# Patient Record
Sex: FEMALE | Race: BLACK OR AFRICAN AMERICAN | Employment: UNEMPLOYED | URBAN - METROPOLITAN AREA
[De-identification: names, ages, dates, MRNs, and addresses within clinical notes are randomized per-mention and may not be internally consistent; named-entity substitution may affect disease eponyms.]

---

## 2017-11-13 ENCOUNTER — APPOINTMENT (OUTPATIENT)
Dept: GENERAL RADIOLOGY | Age: 52
End: 2017-11-13
Attending: EMERGENCY MEDICINE
Payer: COMMERCIAL

## 2017-11-13 ENCOUNTER — HOSPITAL ENCOUNTER (EMERGENCY)
Age: 52
Discharge: HOME | End: 2017-11-13
Attending: EMERGENCY MEDICINE | Admitting: EMERGENCY MEDICINE
Payer: COMMERCIAL

## 2017-11-13 VITALS
DIASTOLIC BLOOD PRESSURE: 65 MMHG | HEART RATE: 100 BPM | WEIGHT: 200 LBS | TEMPERATURE: 98 F | HEIGHT: 65 IN | RESPIRATION RATE: 20 BRPM | OXYGEN SATURATION: 96 % | SYSTOLIC BLOOD PRESSURE: 109 MMHG | BODY MASS INDEX: 33.32 KG/M2

## 2017-11-13 DIAGNOSIS — M79.644 FINGER PAIN, RIGHT: Primary | ICD-10-CM

## 2017-11-13 PROCEDURE — 73140 X-RAY EXAM OF FINGER(S): CPT

## 2017-11-13 PROCEDURE — 99283 EMERGENCY DEPT VISIT LOW MDM: CPT

## 2017-11-13 PROCEDURE — 77030008323 HC SPLNT FNGR GTR DJOR -A

## 2017-11-13 RX ORDER — MIRTAZAPINE 30 MG/1
30 TABLET, FILM COATED ORAL
COMMUNITY
End: 2020-10-29

## 2017-11-13 RX ORDER — HYDROCODONE BITARTRATE AND ACETAMINOPHEN 5; 325 MG/1; MG/1
TABLET ORAL
Qty: 12 TAB | Refills: 0 | Status: SHIPPED | OUTPATIENT
Start: 2017-11-13 | End: 2017-11-29

## 2017-11-13 RX ORDER — HYDROXYZINE 50 MG/1
50 TABLET, FILM COATED ORAL
COMMUNITY
End: 2020-10-29

## 2017-11-13 RX ORDER — ALBUTEROL SULFATE 90 UG/1
2 AEROSOL, METERED RESPIRATORY (INHALATION)
COMMUNITY
End: 2018-04-01

## 2017-11-13 RX ORDER — ESCITALOPRAM OXALATE 20 MG/1
20 TABLET ORAL DAILY
COMMUNITY
End: 2020-10-29

## 2017-11-13 NOTE — ED NOTES
Amelie Munguia is a 54 y.o. female that was discharged in stable. Pt was accompanied by self. Pt is driving. The patients diagnosis, condition and treatment were explained to  patient and aftercare instructions were given. The patient verbalized understanding. Patient armband removed and shredded.

## 2017-11-29 ENCOUNTER — HOSPITAL ENCOUNTER (EMERGENCY)
Age: 52
Discharge: HOME | End: 2017-11-29
Attending: EMERGENCY MEDICINE
Payer: COMMERCIAL

## 2017-11-29 VITALS
OXYGEN SATURATION: 95 % | BODY MASS INDEX: 33.15 KG/M2 | SYSTOLIC BLOOD PRESSURE: 115 MMHG | DIASTOLIC BLOOD PRESSURE: 84 MMHG | HEIGHT: 65 IN | WEIGHT: 199 LBS | TEMPERATURE: 97.8 F | RESPIRATION RATE: 20 BRPM | HEART RATE: 99 BPM

## 2017-11-29 DIAGNOSIS — M54.42 ACUTE LEFT-SIDED LOW BACK PAIN WITH LEFT-SIDED SCIATICA: Primary | ICD-10-CM

## 2017-11-29 DIAGNOSIS — S39.012A STRAIN OF LUMBAR REGION, INITIAL ENCOUNTER: ICD-10-CM

## 2017-11-29 PROCEDURE — 99282 EMERGENCY DEPT VISIT SF MDM: CPT

## 2017-11-29 RX ORDER — HYDROCODONE BITARTRATE AND ACETAMINOPHEN 5; 325 MG/1; MG/1
1 TABLET ORAL
Qty: 12 TAB | Refills: 0 | Status: SHIPPED | OUTPATIENT
Start: 2017-11-29 | End: 2018-02-01

## 2017-11-29 RX ORDER — TRAMADOL HYDROCHLORIDE 50 MG/1
50 TABLET ORAL
Qty: 14 TAB | Refills: 0 | Status: SHIPPED | OUTPATIENT
Start: 2017-11-29 | End: 2017-11-29

## 2017-11-29 RX ORDER — PREDNISONE 10 MG/1
TABLET ORAL
Qty: 21 TAB | Refills: 0 | Status: SHIPPED | OUTPATIENT
Start: 2017-11-29 | End: 2018-02-01

## 2017-11-29 NOTE — DISCHARGE INSTRUCTIONS
Back Pain: Care Instructions  Your Care Instructions    Back pain has many possible causes. It is often related to problems with muscles and ligaments of the back. It may also be related to problems with the nerves, discs, or bones of the back. Moving, lifting, standing, sitting, or sleeping in an awkward way can strain the back. Sometimes you don't notice the injury until later. Arthritis is another common cause of back pain. Although it may hurt a lot, back pain usually improves on its own within several weeks. Most people recover in 12 weeks or less. Using good home treatment and being careful not to stress your back can help you feel better sooner. Follow-up care is a key part of your treatment and safety. Be sure to make and go to all appointments, and call your doctor if you are having problems. It's also a good idea to know your test results and keep a list of the medicines you take. How can you care for yourself at home? · Sit or lie in positions that are most comfortable and reduce your pain. Try one of these positions when you lie down:  ¨ Lie on your back with your knees bent and supported by large pillows. ¨ Lie on the floor with your legs on the seat of a sofa or chair. Delmi Running on your side with your knees and hips bent and a pillow between your legs. ¨ Lie on your stomach if it does not make pain worse. · Do not sit up in bed, and avoid soft couches and twisted positions. Bed rest can help relieve pain at first, but it delays healing. Avoid bed rest after the first day of back pain. · Change positions every 30 minutes. If you must sit for long periods of time, take breaks from sitting. Get up and walk around, or lie in a comfortable position. · Try using a heating pad on a low or medium setting for 15 to 20 minutes every 2 or 3 hours. Try a warm shower in place of one session with the heating pad. · You can also try an ice pack for 10 to 15 minutes every 2 to 3 hours.  Put a thin cloth between the ice pack and your skin. · Take pain medicines exactly as directed. ¨ If the doctor gave you a prescription medicine for pain, take it as prescribed. ¨ If you are not taking a prescription pain medicine, ask your doctor if you can take an over-the-counter medicine. · Take short walks several times a day. You can start with 5 to 10 minutes, 3 or 4 times a day, and work up to longer walks. Walk on level surfaces and avoid hills and stairs until your back is better. · Return to work and other activities as soon as you can. Continued rest without activity is usually not good for your back. · To prevent future back pain, do exercises to stretch and strengthen your back and stomach. Learn how to use good posture, safe lifting techniques, and proper body mechanics. When should you call for help? Call your doctor now or seek immediate medical care if:  ? · You have new or worsening numbness in your legs. ? · You have new or worsening weakness in your legs. (This could make it hard to stand up.)   ? · You lose control of your bladder or bowels. ? Watch closely for changes in your health, and be sure to contact your doctor if:  ? · Your pain gets worse. ? · You are not getting better after 2 weeks. Where can you learn more? Go to http://marcial-karmen.info/. Enter N059 in the search box to learn more about \"Back Pain: Care Instructions. \"  Current as of: March 21, 2017  Content Version: 11.4  © 1547-6237 [employer]. Care instructions adapted under license by [employer] (which disclaims liability or warranty for this information). If you have questions about a medical condition or this instruction, always ask your healthcare professional. [address] any warranty or liability for your use of this information. MyChart Activation    Thank you for requesting access to [facility].  Please follow the instructions below to securely access and download your online medical record. [facility] allows you to send messages to your doctor, view your test results, renew your prescriptions, schedule appointments, and more. How Do I Sign Up? 1. In your internet browser, go to [email or web address]  2. Click on the First Time User? Click Here link in the Sign In box. You will be redirect to the New Member Sign Up page. 3. Enter your [employer] Access Code exactly as it appears below. You will not need to use this code after youve completed the sign-up process. If you do not sign up before the expiration date, you must request a new code. [employer] Access Code: Atif Howe  Expires: 2018  2:46 PM (This is the date your [employer] access code will )    4. Enter the last four digits of your Social Security Number (xxxx) and Date of Birth (mm/dd/yyyy) as indicated and click Submit. You will be taken to the next sign-up page. 5. Create a [employer] ID. This will be your [employer] login ID and cannot be changed, so think of one that is secure and easy to remember. 6. Create a [employer] password. You can change your password at any time. 7. Enter your Password Reset Question and Answer. This can be used at a later time if you forget your password. 8. Enter your e-mail address. You will receive e-mail notification when new information is available in [address] 9. Click Sign Up. You can now view and download portions of your medical record. 10. Click the Download Summary menu link to download a portable copy of your medical information. Additional Information    If you have questions, please visit the Frequently Asked Questions section of the [employer] website at [email or web address] Remember, [employer] is NOT to be used for urgent needs. For medical emergencies, dial 911.

## 2017-11-29 NOTE — ED TRIAGE NOTES
Patient made aware of potential extended wait for room placement related to ER high volume. Patient advised to report any new or worsening symptoms immediately. Patient voices understanding.

## 2017-11-29 NOTE — ED PROVIDER NOTES
HPI Comments: 54 yr old female hx asthma, sciatica, carpal tunnel, and arthritis presents to the ED complaining of L sided low back pain radiating into the L leg x 2 days. Pt states she recently did some heavy lifting and thinks she may \"pinched a nerve in the back. \" Denies loss of bladder/bowel control, numbness, and urinary sx. OTC med shave not alleviated her sx. Denies any trauma to the back. No other complaints. Patient is a 54 y.o. female presenting with back pain. Back Pain    Pertinent negatives include no chest pain, no fever, no headaches, no abdominal pain and no dysuria. Past Medical History:   Diagnosis Date    Arthritis     Asthma     Bilateral leg pain     Carpal tunnel syndrome, right     symptoms    DDD (degenerative disc disease), lumbar     mild    Eczema     Lower back pain     Migraines     Myalgia     lumbar    Numbness and tingling in right hand     Pinched nerve     Sciatica     right lower extremity       Past Surgical History:   Procedure Laterality Date    HX PARTIAL HYSTERECTOMY      HX TONSILLECTOMY      HX TUBAL LIGATION           Family History:   Problem Relation Age of Onset    Hypertension Other     Asthma Other     Hypertension Mother    Emi Coello Arthritis-osteo Mother        Social History     Social History    Marital status:      Spouse name: N/A    Number of children: N/A    Years of education: N/A     Occupational History    Not on file. Social History Main Topics    Smoking status: Former Smoker     Quit date: 3/1/2012    Smokeless tobacco: Never Used      Comment: less than a pack a day.   Current note from 4-1-15 states patient is a non-smoker    Alcohol use No    Drug use: Yes     Special: Cocaine      Comment: last uses 1    Sexual activity: Not on file     Other Topics Concern    Not on file     Social History Narrative         ALLERGIES: Egg; Ibuprofen; Darvocet a500 [propoxyphene n-acetaminophen]; and Fish containing products    Review of Systems   Constitutional: Negative. Negative for chills, diaphoresis, fatigue and fever. HENT: Negative. Negative for congestion, ear pain, rhinorrhea and sore throat. Eyes: Negative. Negative for pain and redness. Respiratory: Negative. Negative for cough, shortness of breath, wheezing and stridor. Cardiovascular: Negative. Negative for chest pain and leg swelling. Gastrointestinal: Negative. Negative for abdominal pain, constipation, diarrhea, nausea and vomiting. Endocrine: Negative. Genitourinary: Negative. Negative for dysuria, flank pain, frequency and hematuria. Musculoskeletal: Positive for back pain. Negative for myalgias, neck pain and neck stiffness. Skin: Negative. Negative for rash and wound. Allergic/Immunologic: Negative. Neurological: Negative. Negative for dizziness, seizures, syncope and headaches. Hematological: Negative. Psychiatric/Behavioral: Negative. All other systems reviewed and are negative. Vitals:    11/29/17 1404   BP: 115/84   Pulse: 99   Resp: 20   Temp: 97.8 °F (36.6 °C)   SpO2: 95%   Weight: 90.3 kg (199 lb)   Height: 5' 5\" (1.651 m)            Physical Exam   Constitutional: She is oriented to person, place, and time. She appears well-developed and well-nourished. She appears distressed. Mildly distressed   HENT:   Head: Normocephalic. Neck: Normal range of motion. Neck supple. Cardiovascular: Normal rate, regular rhythm and normal heart sounds. Exam reveals no gallop and no friction rub. No murmur heard. Pulmonary/Chest: Effort normal and breath sounds normal. No stridor. No respiratory distress. She has no wheezes. She has no rales. Musculoskeletal: Normal range of motion. She exhibits tenderness. She exhibits no edema or deformity. No midline TTP along the spine, TTP noted to the L lumbar erector spinae, ROM intact, SLR positive on the L.  Good sensation and strength of the bilateral lower extremities. Neurological: She is alert and oriented to person, place, and time. Coordination normal.   Gait is steady. Able to ambulate without difficulty. Skin: Skin is warm and dry. No rash noted. She is not diaphoretic. No erythema. Psychiatric: She has a normal mood and affect. Her behavior is normal. Thought content normal.   Nursing note and vitals reviewed. MDM  Number of Diagnoses or Management Options  Acute left-sided low back pain with left-sided sciatica:   Strain of lumbar region, initial encounter:   Diagnosis management comments: Impression:  Lumbar strain, sciatica    Pt declined x-rays    Patient is stable for discharge at this time. Rx for prednisone and norco given. Rest and follow-up with PCP this week. Return to the ED immediately for any new or worsening sx.   Mellisa Martinez PA-C 4:17 PM     Risk of Complications, Morbidity, and/or Mortality  Presenting problems: low  Diagnostic procedures: low  Management options: low    Patient Progress  Patient progress: stable    ED Course       Procedures

## 2017-11-29 NOTE — ED TRIAGE NOTES
Patient states \"pinched nerve\" in her back. Patient states left side back pain onset yesterday. She states that she does not remember what she did to cause onset of pain. States:  'Maybe picking up something too heavy\". Patient states: \"I am wobbling side to side from the pain in my leg\" pointing to left leg. States onset of left leg pain yesterday at same time as back pain onset.

## 2017-12-13 ENCOUNTER — HOSPITAL ENCOUNTER (OUTPATIENT)
Age: 52
Discharge: HOME | End: 2017-12-13
Attending: ORTHOPAEDIC SURGERY
Payer: COMMERCIAL

## 2017-12-13 DIAGNOSIS — S33.5XXA LUMBAR BACK SPRAIN: ICD-10-CM

## 2017-12-13 PROCEDURE — 72148 MRI LUMBAR SPINE W/O DYE: CPT

## 2018-02-01 ENCOUNTER — HOSPITAL ENCOUNTER (EMERGENCY)
Age: 53
Discharge: HOME | End: 2018-02-01
Attending: EMERGENCY MEDICINE | Admitting: EMERGENCY MEDICINE
Payer: COMMERCIAL

## 2018-02-01 VITALS
HEART RATE: 96 BPM | SYSTOLIC BLOOD PRESSURE: 101 MMHG | RESPIRATION RATE: 18 BRPM | DIASTOLIC BLOOD PRESSURE: 76 MMHG | TEMPERATURE: 98 F | WEIGHT: 200 LBS | BODY MASS INDEX: 32.14 KG/M2 | HEIGHT: 66 IN | OXYGEN SATURATION: 99 %

## 2018-02-01 DIAGNOSIS — B34.9 VIRAL SYNDROME: Primary | ICD-10-CM

## 2018-02-01 DIAGNOSIS — R19.7 DIARRHEA, UNSPECIFIED TYPE: ICD-10-CM

## 2018-02-01 LAB
FLUAV AG NPH QL IA: NEGATIVE
FLUBV AG NOSE QL IA: NEGATIVE

## 2018-02-01 PROCEDURE — 99283 EMERGENCY DEPT VISIT LOW MDM: CPT

## 2018-02-01 PROCEDURE — 74011250637 HC RX REV CODE- 250/637: Performed by: EMERGENCY MEDICINE

## 2018-02-01 PROCEDURE — 87804 INFLUENZA ASSAY W/OPTIC: CPT | Performed by: EMERGENCY MEDICINE

## 2018-02-01 RX ORDER — CODEINE PHOSPHATE AND GUAIFENESIN 10; 100 MG/5ML; MG/5ML
5-10 SOLUTION ORAL
Qty: 125 ML | Refills: 0 | Status: SHIPPED | OUTPATIENT
Start: 2018-02-01 | End: 2018-04-01

## 2018-02-01 RX ORDER — GABAPENTIN 300 MG/1
300 CAPSULE ORAL 3 TIMES DAILY
COMMUNITY
End: 2019-11-13

## 2018-02-01 RX ORDER — ACETAMINOPHEN 325 MG/1
650 TABLET ORAL
Status: COMPLETED | OUTPATIENT
Start: 2018-02-01 | End: 2018-02-01

## 2018-02-01 RX ORDER — ONDANSETRON 4 MG/1
4 TABLET, ORALLY DISINTEGRATING ORAL
Qty: 14 TAB | Refills: 0 | Status: SHIPPED | OUTPATIENT
Start: 2018-02-01 | End: 2018-04-01

## 2018-02-01 RX ORDER — ONDANSETRON 4 MG/1
4 TABLET, ORALLY DISINTEGRATING ORAL
Status: COMPLETED | OUTPATIENT
Start: 2018-02-01 | End: 2018-02-01

## 2018-02-01 RX ORDER — LOPERAMIDE HYDROCHLORIDE 2 MG/1
2 CAPSULE ORAL ONCE
Status: COMPLETED | OUTPATIENT
Start: 2018-02-01 | End: 2018-02-01

## 2018-02-01 RX ADMIN — ONDANSETRON 4 MG: 4 TABLET, ORALLY DISINTEGRATING ORAL at 19:57

## 2018-02-01 RX ADMIN — ACETAMINOPHEN 650 MG: 325 TABLET ORAL at 19:57

## 2018-02-01 RX ADMIN — LOPERAMIDE HYDROCHLORIDE 2 MG: 2 CAPSULE ORAL at 19:57

## 2018-02-01 NOTE — LETTER
NOTIFICATION RETURN TO WORK / SCHOOL 
 
2/1/2018 8:57 PM 
 
Ms. Carlie Henson [address] [facility] [address] To Whom It May Concern: 
 
Carlie Henson is currently under the care of [address] [facility] EMERGENCY DEPT. She will return to work/school on: 2/5/18 If there are questions or concerns please have the patient contact our office.  
 
 
 
Sincerely, 
 
 
Terrell Almaraz MD

## 2018-02-02 NOTE — ED PROVIDER NOTES
EMERGENCY DEPARTMENT HISTORY AND PHYSICAL EXAM    7:35 PM      Date: 2/1/2018  Patient Name: Linda Aj    History of Presenting Illness     Chief Complaint   Patient presents with    Generalized Body Aches    Diarrhea    Nausea         History Provided By: Patient    Chief Complaint: generalized body aches  Duration: 2 Days  Timing:  Acute  Location: whole body  Quality: Dull  Severity: Mild  Modifying Factors: Denies any modifying factors. Associated Symptoms: nausea, chills, diarrhea, decreased appetite, coughing, congestion      Additional History (Context): Linda Aj is a 50 y.o. female with a Hx of asthma, myalgia, migraines, arthritis, who presents with acute generalized body aches onset 2 days ago. The patient reports that her symptoms first began two days ago with generalized body aches, cough, and congestion. Since that time she has also developed nausea, diarrhea, decreased appetite, and chills. The patient denies any specific points of pain, but states it hurts all over. She denies any modifying factors, and has taken no medications for them. Denies any change in urinary frequency, dysuria, or vomiting. No further complaints at this time. PCP: Amanda Rueda MD    Current Outpatient Prescriptions   Medication Sig Dispense Refill    gabapentin (NEURONTIN) 300 mg capsule Take 300 mg by mouth three (3) times daily.  mirtazapine (REMERON) 30 mg tablet Take 30 mg by mouth nightly.  escitalopram oxalate (LEXAPRO) 20 mg tablet Take 20 mg by mouth daily.  hydrOXYzine HCl (ATARAX) 50 mg tablet Take 50 mg by mouth three (3) times daily as needed for Itching.  albuterol (PROVENTIL HFA, VENTOLIN HFA, PROAIR HFA) 90 mcg/actuation inhaler Take 2 Puffs by inhalation every six (6) hours as needed for Wheezing.          Past History     Past Medical History:  Past Medical History:   Diagnosis Date    Arthritis     Asthma     Bilateral leg pain     Carpal tunnel syndrome, right symptoms    DDD (degenerative disc disease), lumbar     mild    Eczema     Lower back pain     Migraines     Myalgia     lumbar    Numbness and tingling in right hand     Pinched nerve     Sciatica     right lower extremity       Past Surgical History:  Past Surgical History:   Procedure Laterality Date    HX PARTIAL HYSTERECTOMY      HX TONSILLECTOMY      HX TUBAL LIGATION         Family History:  Family History   Problem Relation Age of Onset    Hypertension Other     Asthma Other     Hypertension Mother    Damon Rey Arthritis-osteo Mother        Social History:  Social History   Substance Use Topics    Smoking status: Former Smoker     Quit date: 3/1/2012    Smokeless tobacco: Never Used      Comment: less than a pack a day. Current note from 4-1-15 states patient is a non-smoker    Alcohol use No       Allergies: Allergies   Allergen Reactions    Egg Anaphylaxis    Ibuprofen Hives    Darvocet A500 [Propoxyphene N-Acetaminophen] Hives    Fish Containing Products Hives         Review of Systems       Review of Systems   Constitutional: Positive for appetite change (decreased appetite) and chills. Fever: patient unsure if she has had a fever. HENT: Positive for congestion. Respiratory: Positive for cough. Negative for shortness of breath. Cardiovascular: Negative for chest pain. Gastrointestinal: Positive for diarrhea and nausea. Negative for abdominal pain and vomiting. Genitourinary: Negative for dysuria and frequency. Musculoskeletal: Positive for myalgias. Negative for back pain. Joint swelling: generalized body aches. Skin: Negative for rash. Neurological: Negative for syncope and light-headedness. All other systems reviewed and are negative.         Physical Exam     Visit Vitals    /76    Pulse 96    Temp 98 °F (36.7 °C)    Resp 18    Ht 5' 6\" (1.676 m)    Wt 90.7 kg (200 lb)    SpO2 99%    BMI 32.28 kg/m2         Physical Exam   Constitutional: She is oriented to person, place, and time. She appears well-developed. HENT:   Head: Normocephalic. Mouth/Throat: Oropharynx is clear and moist.   Eyes: Pupils are equal, round, and reactive to light. Neck: Normal range of motion. Cardiovascular: Normal heart sounds. Tachycardia present. No murmur heard. Tachycardic with regular rhythm. Pulmonary/Chest: Effort normal. She has no rales. Abdominal: Soft. There is no tenderness. Musculoskeletal: Normal range of motion. She exhibits no edema. Neurological: She is alert and oriented to person, place, and time. Skin: Skin is warm and dry. Nursing note and vitals reviewed. Diagnostic Study Results     Vitals:  Patient Vitals for the past 12 hrs:   Temp Pulse Resp BP SpO2   02/01/18 2049 98 °F (36.7 °C) 96 18 101/76 99 %   02/01/18 1919 97.9 °F (36.6 °C) (!) 110 20 (!) 120/94 96 %         Medications ordered:   Medications   loperamide (IMODIUM) capsule 2 mg (2 mg Oral Given 2/1/18 1957)   ondansetron (ZOFRAN ODT) tablet 4 mg (4 mg Oral Given 2/1/18 1957)   acetaminophen (TYLENOL) tablet 650 mg (650 mg Oral Given 2/1/18 1957)         Lab findings:  Recent Results (from the past 12 hour(s))   INFLUENZA A & B AG (RAPID TEST)    Collection Time: 02/01/18  7:50 PM   Result Value Ref Range    Influenza A Antigen NEGATIVE  NEG      Influenza B Antigen NEGATIVE  NEG             X-Ray, CT or other radiology findings or impressions:  No orders to display       Progress notes, Consult notes or additional Procedure notes:   8:48 PM reevaluated the pt. Pt is feeling better, with no complaints. Waiting for repeat vital signs, plan to discharge. 8:58 PM Pt is pain free, feeling much better. Af, nl vitals. Not c/w bacterial inf/sepsis/influenza. Stable for dc and close f.u  asympt at dc. Detailed ret inst given. Disposition:  Diagnosis:   1. Viral syndrome    2.  Diarrhea, unspecified type        Disposition: home    Follow-up Information     Follow up With Details Comments Thor Bowers MD Schedule an appointment as soon as possible for a visit in 1 day  [address] [phone]             Patient's Medications   Start Taking    No medications on file   Continue Taking    ALBUTEROL (PROVENTIL HFA, VENTOLIN HFA, PROAIR HFA) 90 MCG/ACTUATION INHALER    Take 2 Puffs by inhalation every six (6) hours as needed for Wheezing. ESCITALOPRAM OXALATE (LEXAPRO) 20 MG TABLET    Take 20 mg by mouth daily. GABAPENTIN (NEURONTIN) 300 MG CAPSULE    Take 300 mg by mouth three (3) times daily. HYDROXYZINE HCL (ATARAX) 50 MG TABLET    Take 50 mg by mouth three (3) times daily as needed for Itching. MIRTAZAPINE (REMERON) 30 MG TABLET    Take 30 mg by mouth nightly. These Medications have changed    No medications on file   Stop Taking    HYDROCODONE-ACETAMINOPHEN (NORCO) 5-325 MG PER TABLET    Take 1 Tab by mouth every six (6) hours as needed for Pain. Max Daily Amount: 4 Tabs. PREDNISONE (STERAPRED DS) 10 MG DOSE PACK    Use as directed         Scribe Attestation     Martha Fragoso acting as a scribe for and in the presence of Jan Martinez MD      February 01, 2018 at 7:35 PM       Provider Attestation:      I personally performed the services described in the documentation, reviewed the documentation, as recorded by the scribe in my presence, and it accurately and completely records my words and actions.  February 01, 2018 at 7:35 PM - Jan Martinez MD

## 2018-02-02 NOTE — ED NOTES
Flu swab obtained from pt, pt medicated and pt given fluids at this time. Pt c/o nausea with diarrhea.  Pt awake and alert color pink

## 2018-02-02 NOTE — ED TRIAGE NOTES
C/o generalized bodyaches & chills, headache, nausea,  diarrhea x 3 days. States 3 episodes of diarrhea today. Denies vomiting. Has not taken any OTC meds for symptoms.

## 2018-02-02 NOTE — ED NOTES
Hourly rounding complete.    Safety  Pt resting    [ x] On stretcher with side rails up and bed in locked position, call bell within reach  [ ] Sitting in chair with casters locked, call bell within reach     Toileting  [  x] pt denies need to use bathroom  [ ] pt assisted to bathroom  [ ] pt assisted with bedpan  [ ] pt independent to bathroom as needed     Ongoing Plan of Care  Plan of care and expected time for test and results reviewed with pt.     Pain Management / Comfort  [ ] dimmed lights  [ ] warm blanket provided  x[ ] pain assessed, pt denies pain at this time, pt states that she feels better, no diarrhea since arrival to ED  [ ] monitor alarms reviewed

## 2018-04-01 ENCOUNTER — APPOINTMENT (OUTPATIENT)
Dept: GENERAL RADIOLOGY | Age: 53
End: 2018-04-01
Attending: EMERGENCY MEDICINE
Payer: COMMERCIAL

## 2018-04-01 ENCOUNTER — HOSPITAL ENCOUNTER (EMERGENCY)
Age: 53
Discharge: HOME | End: 2018-04-01
Attending: EMERGENCY MEDICINE
Payer: COMMERCIAL

## 2018-04-01 VITALS
RESPIRATION RATE: 22 BRPM | SYSTOLIC BLOOD PRESSURE: 114 MMHG | BODY MASS INDEX: 33.32 KG/M2 | HEART RATE: 105 BPM | HEIGHT: 65 IN | OXYGEN SATURATION: 95 % | DIASTOLIC BLOOD PRESSURE: 76 MMHG | WEIGHT: 200 LBS | TEMPERATURE: 98.5 F

## 2018-04-01 DIAGNOSIS — J44.9 ASTHMATIC BRONCHITIS , CHRONIC (HCC): Primary | ICD-10-CM

## 2018-04-01 PROCEDURE — 74011250637 HC RX REV CODE- 250/637: Performed by: EMERGENCY MEDICINE

## 2018-04-01 PROCEDURE — 99283 EMERGENCY DEPT VISIT LOW MDM: CPT

## 2018-04-01 PROCEDURE — 93005 ELECTROCARDIOGRAM TRACING: CPT

## 2018-04-01 PROCEDURE — 71046 X-RAY EXAM CHEST 2 VIEWS: CPT

## 2018-04-01 RX ORDER — ALBUTEROL SULFATE 90 UG/1
2 AEROSOL, METERED RESPIRATORY (INHALATION)
Qty: 1 INHALER | Refills: 0 | Status: SHIPPED | OUTPATIENT
Start: 2018-04-01 | End: 2019-11-13

## 2018-04-01 RX ORDER — AMOXICILLIN 500 MG/1
500 TABLET, FILM COATED ORAL 3 TIMES DAILY
Qty: 30 TAB | Refills: 0 | Status: SHIPPED | OUTPATIENT
Start: 2018-04-01 | End: 2018-04-11

## 2018-04-01 RX ORDER — GUAIFENESIN 100 MG/5ML
200 SOLUTION ORAL
Status: COMPLETED | OUTPATIENT
Start: 2018-04-01 | End: 2018-04-01

## 2018-04-01 RX ORDER — AMOXICILLIN 250 MG/1
500 CAPSULE ORAL EVERY 8 HOURS
Status: DISCONTINUED | OUTPATIENT
Start: 2018-04-01 | End: 2018-04-01

## 2018-04-01 RX ORDER — AMOXICILLIN 250 MG/1
500 CAPSULE ORAL EVERY 8 HOURS
Status: DISCONTINUED | OUTPATIENT
Start: 2018-04-01 | End: 2018-04-02 | Stop reason: HOSPADM

## 2018-04-01 RX ORDER — GUAIFENESIN 100 MG/5ML
200 SOLUTION ORAL
Qty: 10 ML | Refills: 0 | Status: SHIPPED | OUTPATIENT
Start: 2018-04-01 | End: 2019-11-13

## 2018-04-01 RX ADMIN — AMOXICILLIN 500 MG: 250 CAPSULE ORAL at 20:51

## 2018-04-01 RX ADMIN — GUAIFENESIN 200 MG: 200 SOLUTION ORAL at 20:51

## 2018-04-01 NOTE — ED TRIAGE NOTES
Pt. Reports having the flu and coughing up yellow mucous, she states she is now coughing up clear fluids. She states she now is coughing up clear mucous and having chest congestion with rib pain when she coughs, she states she thinks she may have pneumonia. She denies CHEST PAIN or SOB.

## 2018-04-02 LAB
ATRIAL RATE: 106 BPM
CALCULATED P AXIS, ECG09: 65 DEGREES
CALCULATED R AXIS, ECG10: -11 DEGREES
CALCULATED T AXIS, ECG11: 42 DEGREES
DIAGNOSIS, 93000: NORMAL
P-R INTERVAL, ECG05: 150 MS
Q-T INTERVAL, ECG07: 364 MS
QRS DURATION, ECG06: 66 MS
QTC CALCULATION (BEZET), ECG08: 483 MS
VENTRICULAR RATE, ECG03: 106 BPM

## 2018-04-02 NOTE — ED PROVIDER NOTES
EMERGENCY DEPARTMENT HISTORY AND PHYSICAL EXAM    8:29 PM      Date: 4/1/2018  Patient Name: Sendy Navarro    History of Presenting Illness     Chief Complaint   Patient presents with    Cough    Chest Congestion         History Provided By: Patient    Chief Complaint: Congestion; Cough   Duration:  Weeks  Timing:  Constant  Location: N/A  Quality: N/A  Severity: N/A  Modifying Factors: None   Associated Symptoms: Rib pain when coughing       Additional History (Context): Sendy Navarro is a 50 y.o. female with PMHx of asthma presenting to the ED c/o constant productive cough and congestion for the past 2 weeks. Pt also reports rib pain when coughing. Reports no modifying factors for her symptoms. States she recently had the flu. Notes she was coughing up yellow mucus and is now coughing up clear mucus. Admits to smoking. Denies chest pain, shortness of breath, and any other symptoms or complaints. PCP: Wilian Merino MD    Current Facility-Administered Medications   Medication Dose Route Frequency Provider Last Rate Last Dose    amoxicillin (AMOXIL) capsule 500 mg  500 mg Oral Q8H Steven Will MD        guaiFENesin (ROBITUSSIN) 100 mg/5 mL oral liquid 200 mg  200 mg Oral NOW Steven Will MD         Current Outpatient Prescriptions   Medication Sig Dispense Refill    amoxicillin 500 mg tab Take 500 mg by mouth three (3) times daily for 10 days. 30 Tab 0    albuterol (PROVENTIL HFA, VENTOLIN HFA, PROAIR HFA) 90 mcg/actuation inhaler Take 2 Puffs by inhalation every four (4) hours as needed for Wheezing. 1 Inhaler 0    albuterol (PROVENTIL HFA, VENTOLIN HFA, PROAIR HFA) 90 mcg/actuation inhaler Take 2 Puffs by inhalation every six (6) hours as needed for Wheezing. 1 Inhaler 0    guaiFENesin (ROBITUSSIN) 100 mg/5 mL liquid Take 10 mL by mouth four (4) times daily as needed for Cough. 10 mL 0    gabapentin (NEURONTIN) 300 mg capsule Take 300 mg by mouth three (3) times daily.       mirtazapine (REMERON) 30 mg tablet Take 30 mg by mouth nightly.  escitalopram oxalate (LEXAPRO) 20 mg tablet Take 20 mg by mouth daily.  hydrOXYzine HCl (ATARAX) 50 mg tablet Take 50 mg by mouth three (3) times daily as needed for Itching. Past History     Past Medical History:  Past Medical History:   Diagnosis Date    Arthritis     Asthma     Bilateral leg pain     Carpal tunnel syndrome, right     symptoms    DDD (degenerative disc disease), lumbar     mild    Eczema     Lower back pain     Migraines     Myalgia     lumbar    Numbness and tingling in right hand     Pinched nerve     Sciatica     right lower extremity       Past Surgical History:  Past Surgical History:   Procedure Laterality Date    HX PARTIAL HYSTERECTOMY      HX TONSILLECTOMY      HX TUBAL LIGATION         Family History:  Family History   Problem Relation Age of Onset    Hypertension Other     Asthma Other     Hypertension Mother    Rose Dennis Arthritis-osteo Mother        Social History:  Social History   Substance Use Topics    Smoking status: Former Smoker     Quit date: 3/1/2012    Smokeless tobacco: Never Used      Comment: less than a pack a day. Current note from 4-1-15 states patient is a non-smoker    Alcohol use No       Allergies: Allergies   Allergen Reactions    Egg Anaphylaxis    Ibuprofen Hives    Darvocet A500 [Propoxyphene N-Acetaminophen] Hives    Fish Containing Products Hives         Review of Systems       Review of Systems   Constitutional: Negative. Negative for fever. HENT: Positive for congestion. Eyes: Negative. Respiratory: Positive for cough. Negative for shortness of breath. Cardiovascular: Negative. Negative for chest pain. Gastrointestinal: Negative. Endocrine: Negative. Genitourinary: Negative. Musculoskeletal:        + Rib pain (when coughing)   Skin: Negative. Allergic/Immunologic: Negative. Neurological: Negative. Hematological: Negative. Psychiatric/Behavioral: Negative. All other systems reviewed and are negative. Physical Exam     Visit Vitals    /76 (BP 1 Location: Left arm, BP Patient Position: At rest)    Pulse (!) 105    Temp 98.5 °F (36.9 °C)    Resp 22    Ht 5' 5\" (1.651 m)    Wt 90.7 kg (200 lb)    SpO2 95%    BMI 33.28 kg/m2         Physical Exam   Constitutional: She is oriented to person, place, and time. She appears well-developed and well-nourished. No distress. HENT:   Head: Normocephalic. Right Ear: External ear normal.   Left Ear: External ear normal.   Mouth/Throat: No oropharyngeal exudate. Eyes: Conjunctivae and EOM are normal. Pupils are equal, round, and reactive to light. Right eye exhibits no discharge. Left eye exhibits no discharge. No scleral icterus. Neck: Normal range of motion. Neck supple. No JVD present. No tracheal deviation present. No thyromegaly present. Cardiovascular: Normal rate, regular rhythm, normal heart sounds and intact distal pulses. Exam reveals no gallop and no friction rub. No murmur heard. Pulmonary/Chest: Effort normal. No stridor. No respiratory distress. She has no wheezes. She has rhonchi (faint). She has no rales. She exhibits no tenderness. Abdominal: Soft. Bowel sounds are normal. She exhibits no distension and no mass. There is no tenderness. There is no rebound and no guarding. Musculoskeletal: Normal range of motion. She exhibits no edema or tenderness. Lymphadenopathy:     She has no cervical adenopathy. Neurological: She is alert and oriented to person, place, and time. She displays normal reflexes. No cranial nerve deficit. She exhibits normal muscle tone. Coordination normal.   Skin: Skin is warm and dry. No rash noted. She is not diaphoretic. No erythema. No pallor. Nursing note and vitals reviewed.         Diagnostic Study Results     Labs -  Recent Results (from the past 12 hour(s))   EKG, 12 LEAD, INITIAL    Collection Time: 04/01/18 7:49 PM   Result Value Ref Range    Ventricular Rate 106 BPM    Atrial Rate 106 BPM    P-R Interval 150 ms    QRS Duration 66 ms    Q-T Interval 364 ms    QTC Calculation (Bezet) 483 ms    Calculated P Axis 65 degrees    Calculated R Axis -11 degrees    Calculated T Axis 42 degrees    Diagnosis       Sinus tachycardia  Otherwise normal ECG  No previous ECGs available         Radiologic Studies -   XR CHEST PA LAT    (Results Pending)   8:30 PM Negative chest X-ray. Interpreted by Miguelina Vicente MD       Medical Decision Making   I am the first provider for this patient. I reviewed the vital signs, available nursing notes, past medical history, past surgical history, family history and social history. Vital Signs-Reviewed the patient's vital signs. Pulse Oximetry Analysis -  95% on room air, normal     EKG: Interpreted by the EP. Time Interpreted: 19:50    Rate: 106   Rhythm: Sinus Tachycardia    Interpretation: Negative EKG. Records Reviewed: Nursing Notes (Time of Review: 8:29 PM)    ED Course: Progress Notes, Reevaluation, and Consults:  Patient symptoms resolved with treatment. Provider Notes (Medical Decision Making): Dif Dx: bronchitis, asthma, URI, pneumonia    Diagnosis     Clinical Impression:   1. Asthmatic bronchitis , chronic (HCC)        Disposition: Discharged     Follow-up Information     Follow up With Details Comments Matthew Max MD Call in 2 days For follow up  [address]  [address] [place] [address] [address] [place] [address] EMERGENCY DEPT  As needed, If symptoms worsen Arti Santizo [id number] [phone]           Patient's Medications   Start Taking    ALBUTEROL (PROVENTIL HFA, VENTOLIN HFA, PROAIR HFA) 90 MCG/ACTUATION INHALER    Take 2 Puffs by inhalation every four (4) hours as needed for Wheezing. AMOXICILLIN 500 MG TAB    Take 500 mg by mouth three (3) times daily for 10 days.     GUAIFENESIN (ROBITUSSIN) 100 MG/5 ML LIQUID    Take 10 mL by mouth four (4) times daily as needed for Cough. Continue Taking    ESCITALOPRAM OXALATE (LEXAPRO) 20 MG TABLET    Take 20 mg by mouth daily. GABAPENTIN (NEURONTIN) 300 MG CAPSULE    Take 300 mg by mouth three (3) times daily. HYDROXYZINE HCL (ATARAX) 50 MG TABLET    Take 50 mg by mouth three (3) times daily as needed for Itching. MIRTAZAPINE (REMERON) 30 MG TABLET    Take 30 mg by mouth nightly. These Medications have changed    Modified Medication Previous Medication    ALBUTEROL (PROVENTIL HFA, VENTOLIN HFA, PROAIR HFA) 90 MCG/ACTUATION INHALER albuterol (PROVENTIL HFA, VENTOLIN HFA, PROAIR HFA) 90 mcg/actuation inhaler       Take 2 Puffs by inhalation every six (6) hours as needed for Wheezing. Take 2 Puffs by inhalation every six (6) hours as needed for Wheezing. Stop Taking    GUAIFENESIN-CODEINE (ROBITUSSIN AC) 100-10 MG/5 ML SOLUTION    Take 5-10 mL by mouth every six (6) hours as needed for Cough. Max Daily Amount: 40 mL. ONDANSETRON (ZOFRAN ODT) 4 MG DISINTEGRATING TABLET    Take 1 Tab by mouth every eight (8) hours as needed for Nausea.     _______________________________    Attestations:  Scribe Attestation     Ed West acting as a scribe for and in the presence of Sander Obregon MD      April 01, 2018 at 8:39 PM       Provider Attestation:      I personally performed the services described in the documentation, reviewed the documentation, as recorded by the scribe in my presence, and it accurately and completely records my words and actions.  April 01, 2018 at 8:39 PM - Sander Obregon MD    _______________________________

## 2018-04-02 NOTE — DISCHARGE INSTRUCTIONS
Learning About Chronic Bronchitis  What is chronic bronchitis? Chronic bronchitis is long-term swelling and the buildup of mucus in the airways of your lungs. The airways (bronchial tubes) get inflamed and make a lot of mucus. This can narrow or block the airways, making it hard for you to breathe. It is a form of COPD (chronic obstructive pulmonary disease). Chronic bronchitis is usually caused by smoking. But chemical fumes, dust, or air pollution also can cause it over time. What can you expect when you have chronic bronchitis? Chronic bronchitis gets worse over time. You cannot undo the damage to your lungs. Over time, you may find that:  · You get short of breath even when you do simple things like get dressed or fix a meal.  · It is hard to eat or exercise. · You lose weight and feel weaker. Over many years, the swelling and mucus from chronic bronchitis make it more likely that you will get lung infections. But there are things you can do to prevent more damage and feel better. What are the symptoms? The main symptoms of chronic bronchitis are:  · A cough that will not go away. · Mucus that comes up when you cough. · Shortness of breath that gets worse when you exercise. At times, your symptoms may suddenly flare up and get much worse. This is a called an exacerbation (say \"egg-ZAADDY-er-BAY-kiarra\"). When this happens, your usual symptoms quickly get worse and stay bad. This can be dangerous. You may have to go to the hospital.  How can you keep chronic bronchitis from getting worse? Don't smoke. That is the best way to keep chronic bronchitis from getting worse. If you already smoke, it is never too late to stop. If you need help quitting, talk to your doctor about stop-smoking programs and medicines. These can increase your chances of quitting for good. You can do other things to keep chronic bronchitis from getting worse:  · Avoid bad air.  Air pollution, chemical fumes, and dust also can make chronic bronchitis worse. · Get a flu shot every year. A shot may keep the flu from turning into something more serious, like pneumonia. A flu shot also may lower your chances of having a flare-up. · Get a pneumococcal shot. A shot can prevent some of the serious complications of pneumonia. Ask your doctor how often you should get this shot. How is chronic bronchitis treated? Chronic bronchitis is treated with medicines and oxygen. You also can take steps at home to stay healthy and keep your condition from getting worse. Medicines and oxygen therapy  · You may be taking medicines such as:  ¨ Bronchodilators. These help open your airways and make breathing easier. Bronchodilators are either short-acting (work for 6 to 9 hours) or long-acting (work for 24 hours). You inhale most bronchodilators, so they start to act quickly. Always carry your quick-relief inhaler with you in case you need it while you are away from home. ¨ Corticosteroids. These reduce airway inflammation. They come in pill or inhaled form. You must take these medicines every day for them to work well. ¨ Antibiotics. These medicines are used when you have a bacterial lung infection. · Take your medicines exactly as prescribed. Call your doctor if you think you are having a problem with your medicine. · Oxygen therapy boosts the amount of oxygen in your blood and helps you breathe easier. Use the flow rate your doctor has recommended, and do not change it without talking to your doctor first.  Other care at home  · If your doctor recommends it, get more exercise. Walking is a good choice. Bit by bit, increase the amount you walk every day. Try for at least 30 minutes on most days of the week. · Learn breathing methods-such as breathing through pursed lips-to help you become less short of breath. · If your doctor has not set you up with a pulmonary rehabilitation program, talk to him or her about whether rehab is right for you.  Rehab includes exercise programs, education about your disease and how to manage it, help with diet and other changes, and emotional support. · Eat regular, healthy meals. Use bronchodilators about 1 hour before you eat to make it easier to eat. Eat several small meals instead of three large ones. Drink beverages at the end of the meal. Avoid foods that are hard to chew. Follow-up care is a key part of your treatment and safety. Be sure to make and go to all appointments, and call your doctor if you are having problems. It's also a good idea to know your test results and keep a list of the medicines you take. Where can you learn more? Go to http://marcialeDreams Edusoftkarmen.info/. Enter R054 in the search box to learn more about \"Learning About Chronic Bronchitis. \"  Current as of: May 12, 2017  Content Version: 11.4  © 6466-9714 [employer]. Care instructions adapted under license by [employer] (which disclaims liability or warranty for this information). If you have questions about a medical condition or this instruction, always ask your healthcare professional. [address] any warranty or liability for your use of this information. Asthma in Adults: Care Instructions  Your Care Instructions    During an asthma attack, your airways swell and narrow as a reaction to certain things (triggers). This makes it hard to breathe. You may be able to prevent asthma attacks if you avoid the things that set off your asthma symptoms. Keeping your asthma under control and treating symptoms before they get bad can help you avoid severe attacks. If you can control your asthma, you may be able to do all of your normal daily activities. You may also avoid asthma attacks and trips to the hospital.  Follow-up care is a key part of your treatment and safety. Be sure to make and go to all appointments, and call your doctor if you are having problems.  It's also a good idea to know your test results and keep a list of the medicines you take. How can you care for yourself at home? · Follow your asthma action plan so you can manage your symptoms at home. An asthma action plan will help you prevent and control airway reactions and will tell you what to do during an asthma attack. If you do not have an asthma action plan, work with your doctor to build one. · Take your asthma medicine exactly as prescribed. Medicine plays an important role in controlling asthma. Talk to your doctor right away if you have any questions about what to take and how to take it. ¨ Use your quick-relief medicine when you have symptoms of an attack. Quick-relief medicine often is an albuterol inhaler. Some people need to use quick-relief medicine before they exercise. ¨ Take your controller medicine every day, not just when you have symptoms. Controller medicine is usually an inhaled corticosteroid. The goal is to prevent problems before they occur. Do not use your controller medicine to try to treat an attack that has already started. It does not work fast enough to help. ¨ If your doctor prescribed corticosteroid pills to use during an attack, take them as directed. They may take hours to work, but they may shorten the attack and help you breathe better. ¨ Keep your quick-relief medicine with you at all times. · Talk to your doctor before using other medicines. Some medicines, such as aspirin, can cause asthma attacks in some people. · Check yourself for asthma symptoms to know which step to follow in your action plan. Watch for things like being short of breath, having chest tightness, coughing, and wheezing. Also notice if symptoms wake you up at night or if you get tired quickly when you exercise. · If you have a peak flow meter, use it to check how well you are breathing. This can help you predict when an asthma attack is going to occur.  Then you can take medicine to prevent the asthma attack or make it less severe. · See your doctor regularly. These visits will help you learn more about asthma and what you can do to control it. Your doctor will monitor your treatment to make sure the medicine is helping you. · Keep track of your asthma attacks and your treatment. After you have had an attack, write down what triggered it, what helped end it, and any concerns you have about your asthma action plan. Take your diary when you see your doctor. You can then review your asthma action plan and decide if it is working. · Do not smoke or allow others to smoke around you. Avoid smoky places. Smoking makes asthma worse. If you need help quitting, talk to your doctor about stop-smoking programs and medicines. These can increase your chances of quitting for good. · Learn what triggers an asthma attack for you, and avoid the triggers when you can. Common triggers include colds, smoke, air pollution, dust, pollen, mold, pets, cockroaches, stress, and cold air. · Avoid colds and the flu. Get a pneumococcal vaccine shot. If you have had one before, ask your doctor whether you need a second dose. Get a flu vaccine every fall. If you must be around people with colds or the flu, wash your hands often. When should you call for help? Call 911 anytime you think you may need emergency care. For example, call if:  ? · You have severe trouble breathing. ?Call your doctor now or seek immediate medical care if:  ? · Your symptoms do not get better after you have followed your asthma action plan. ? · You cough up yellow, dark brown, or bloody mucus (sputum). ? Watch closely for changes in your health, and be sure to contact your doctor if:  ? · Your coughing and wheezing get worse. ? · You need to use quick-relief medicine on more than 2 days a week (unless it is just for exercise). ? · You need help figuring out what is triggering your asthma attacks. Where can you learn more?   Go to http://marcial-karmen.info/. Enter P597 in the search box to learn more about \"Asthma in Adults: Care Instructions. \"  Current as of: May 12, 2017  Content Version: 11.4  © 3498-2147 [employer]. Care instructions adapted under license by [employer] (which disclaims liability or warranty for this information). If you have questions about a medical condition or this instruction, always ask your healthcare professional. [place] [address] any warranty or liability for your use of this information.

## 2019-11-13 ENCOUNTER — HOSPITAL ENCOUNTER (EMERGENCY)
Age: 54
Discharge: HOME | End: 2019-11-13
Attending: EMERGENCY MEDICINE
Payer: COMMERCIAL

## 2019-11-13 VITALS
BODY MASS INDEX: 31.16 KG/M2 | SYSTOLIC BLOOD PRESSURE: 119 MMHG | TEMPERATURE: 97.8 F | HEART RATE: 123 BPM | HEIGHT: 65 IN | WEIGHT: 187 LBS | DIASTOLIC BLOOD PRESSURE: 82 MMHG | OXYGEN SATURATION: 99 %

## 2019-11-13 DIAGNOSIS — L30.9 ECZEMA, UNSPECIFIED TYPE: Primary | ICD-10-CM

## 2019-11-13 PROCEDURE — 96372 THER/PROPH/DIAG INJ SC/IM: CPT

## 2019-11-13 PROCEDURE — 99282 EMERGENCY DEPT VISIT SF MDM: CPT

## 2019-11-13 PROCEDURE — 74011250636 HC RX REV CODE- 250/636: Performed by: EMERGENCY MEDICINE

## 2019-11-13 RX ORDER — TOPIRAMATE 200 MG/1
TABLET ORAL 2 TIMES DAILY
COMMUNITY
End: 2020-10-29

## 2019-11-13 RX ORDER — PREDNISONE 5 MG/1
TABLET ORAL
Qty: 15 TAB | Refills: 0 | Status: SHIPPED | OUTPATIENT
Start: 2019-11-13 | End: 2020-10-29

## 2019-11-13 RX ORDER — GEMFIBROZIL 600 MG/1
600 TABLET, FILM COATED ORAL 2 TIMES DAILY
COMMUNITY
End: 2020-10-29

## 2019-11-13 RX ORDER — TRIAMCINOLONE ACETONIDE 5 MG/G
OINTMENT TOPICAL 2 TIMES DAILY
Qty: 30 G | Refills: 0 | Status: SHIPPED | OUTPATIENT
Start: 2019-11-13 | End: 2020-10-29

## 2019-11-13 RX ORDER — PREDNISONE 5 MG/1
TABLET ORAL DAILY
COMMUNITY
End: 2020-10-29

## 2019-11-13 RX ORDER — DEXAMETHASONE SODIUM PHOSPHATE 4 MG/ML
6 INJECTION, SOLUTION INTRA-ARTICULAR; INTRALESIONAL; INTRAMUSCULAR; INTRAVENOUS; SOFT TISSUE
Status: COMPLETED | OUTPATIENT
Start: 2019-11-13 | End: 2019-11-13

## 2019-11-13 RX ADMIN — DEXAMETHASONE SODIUM PHOSPHATE 6 MG: 4 INJECTION, SOLUTION INTRAMUSCULAR; INTRAVENOUS at 09:23

## 2019-11-13 NOTE — ED TRIAGE NOTES
Pt states was [facility] last [facility], while there was dx with syphilis and treated with 3 doses of Penicillin, pt states since then has had \"rash\" all over and eczema worsened. pt is on Hydroxyzine and steroids and lotion however states it is not improving.

## 2019-11-13 NOTE — DISCHARGE INSTRUCTIONS
Patient Education        Atopic Dermatitis: Care Instructions  Your Care Instructions  Atopic dermatitis (also called eczema) is a skin problem that causes intense itching and a red, raised rash. In severe cases, the rash develops clear fluid-filled blisters. The rash is not contagious. People with this condition seem to have very sensitive immune systems that are likely to react to things that cause allergies. The immune system is the body's way of fighting infection. There is no cure for atopic dermatitis, but you may be able to control it with care at home. Follow-up care is a key part of your treatment and safety. Be sure to make and go to all appointments, and call your doctor if you are having problems. It's also a good idea to know your test results and keep a list of the medicines you take. How can you care for yourself at home? · Use moisturizer at least twice a day. · If your doctor prescribes a cream, use it as directed. If your doctor prescribes other medicine, take it exactly as directed. · Wash the affected area with water only. Soap can make dryness and itching worse. Pat dry. · Apply a moisturizer after bathing. Use a cream such as Lubriderm, Moisturel, or Cetaphil that does not irritate the skin or cause a rash. Apply the cream while your skin is still damp after lightly drying with a towel. · Use cold, wet cloths to reduce itching. · Keep cool, and stay out of the sun. · If itching affects your normal activities, an over-the-counter antihistamine, such as diphenhydramine (Benadryl) or loratadine (Claritin) may help. Read and follow all instructions on the label. When should you call for help? Call your doctor now or seek immediate medical care if:    · Your rash gets worse and you have a fever.     · You have new blisters or bruises, or the rash spreads and looks like a sunburn.     · You have signs of infection, such as:  ? Increased pain, swelling, warmth, or redness.   ? Red streaks leading from the rash. ? Pus draining from the rash. ? A fever.     · You have crusting or oozing sores.     · You have joint aches or body aches along with your rash.    Watch closely for changes in your health, and be sure to contact your doctor if:    · Your rash does not clear up after 2 to 3 weeks of home treatment.     · Itching interferes with your sleep or daily activities. Where can you learn more? Go to http://marcial-karmen.info/. Enter I702 in the search box to learn more about \"Atopic Dermatitis: Care Instructions. \"  Current as of: April 1, 2019  Content Version: 12.2  © 4346-2657 [employer]. Care instructions adapted under license by "[employer]" (which disclaims liability or warranty for this information). If you have questions about a medical condition or this instruction, always ask your healthcare professional. [place] [address] any warranty or liability for your use of this information.

## 2019-11-13 NOTE — ED NOTES
Hayley Walton is a 48 y.o. female that was discharged in good condition. The patients diagnosis, condition and treatment were explained to  patient and aftercare instructions were given. The patient verbalized understanding. Patient armband removed and shredded.

## 2019-11-13 NOTE — ED PROVIDER NOTES
HPI patient presents today complaining of an exacerbation of her chronic eczema. She says she has had eczema most of her life. She recently got out of shelter for 4 days ago and said she had not been receiving many of her regular eczema treatments while she was in shelter. She says her eczema is prickly bad right now. She has a follow-up appointment scheduled with her dermatologist in 3 weeks. She is here today requesting medications until she can get them to see her dermatologist.  She complains of a itchy scaly rash on her trunk and extremities and face. She said the itching is particularly worse recently. No other complaints at this time.     Past Medical History:   Diagnosis Date    Arthritis     Asthma     Bilateral leg pain     Carpal tunnel syndrome, right     symptoms    DDD (degenerative disc disease), lumbar     mild    Eczema     Lower back pain     Migraines     Myalgia     lumbar    Numbness and tingling in right hand     Pinched nerve     Sciatica     right lower extremity       Past Surgical History:   Procedure Laterality Date    HX PARTIAL HYSTERECTOMY      HX TONSILLECTOMY      HX TUBAL LIGATION           Family History:   Problem Relation Age of Onset    Hypertension Other     Asthma Other     Hypertension Mother    [address] [place] [address] Arthritis-osteo Mother        Social History     Socioeconomic History    Marital status:      Spouse name: Not on file    Number of children: Not on file    Years of education: Not on file    Highest education level: Not on file   Occupational History    Not on file   Social Needs    Financial resource strain: Not on file    Food insecurity:     Worry: Not on file     Inability: Not on file    Transportation needs:     Medical: Not on file     Non-medical: Not on file   Tobacco Use    Smoking status: Former Smoker     Last attempt to quit: 3/1/2012     Years since quittin.7    Smokeless tobacco: Never Used    Tobacco comment: less than a pack a day.  Current note from 4-1-15 states patient is a non-smoker   Substance and Sexual Activity    Alcohol use: No    Drug use: Yes     Types: Cocaine     Comment: last uses 1    Sexual activity: Not on file   Lifestyle    Physical activity:     Days per week: Not on file     Minutes per session: Not on file    Stress: Not on file   Relationships    Social connections:     Talks on phone: Not on file     Gets together: Not on file     Attends Roman Catholic service: Not on file     Active member of club or organization: Not on file     Attends meetings of clubs or organizations: Not on file     Relationship status: Not on file    Intimate partner violence:     Fear of current or ex partner: Not on file     Emotionally abused: Not on file     Physically abused: Not on file     Forced sexual activity: Not on file   Other Topics Concern    Not on file   Social History Narrative    Not on file         ALLERGIES: Egg; Ibuprofen; Darvocet a500 [propoxyphene n-acetaminophen]; and Fish containing products    Review of Systems   Constitutional: Negative. HENT: Negative. Respiratory: Negative. Cardiovascular: Negative. Gastrointestinal: Negative. Genitourinary: Negative. Skin: Positive for rash. Neurological: Negative. Vitals:    11/13/19 0842   BP: 119/82   Pulse: (!) 123   Temp: 97.8 °F (36.6 °C)   SpO2: 99%   Weight: 84.8 kg (187 lb)   Height: 5' 5\" (1.651 m)            Physical Exam   Constitutional: She is oriented to person, place, and time. She appears well-developed. HENT:   Head: Normocephalic and atraumatic. Mouth/Throat: Oropharynx is clear and moist.   Eyes: Pupils are equal, round, and reactive to light. Conjunctivae and EOM are normal.   Neck: Normal range of motion. Neck supple. Cardiovascular: Normal rate and regular rhythm. Pulmonary/Chest: Effort normal and breath sounds normal.   Abdominal: Soft. Musculoskeletal: Normal range of motion.    Neurological: She is alert and oriented to person, place, and time. Skin: Skin is warm. Rash noted. Diffuse scaly chronic appearing rash on trunk arms extremity and cheeks. Rash is consistent with atopic dermatitis. No cellulitis no abscess no drainage noted scaly macular rashes especially prominent on arms and legs   Psychiatric: She has a normal mood and affect. Nursing note and vitals reviewed. MDM   Patient understands and agrees with the disposition and follow-up plan.   Lorraine Lizama MD 9:37 AM  Procedures

## 2020-08-14 ENCOUNTER — HOSPITAL ENCOUNTER (EMERGENCY)
Age: 55
Discharge: HOME | End: 2020-08-14
Attending: EMERGENCY MEDICINE
Payer: COMMERCIAL

## 2020-08-14 ENCOUNTER — APPOINTMENT (OUTPATIENT)
Dept: GENERAL RADIOLOGY | Age: 55
End: 2020-08-14
Attending: EMERGENCY MEDICINE
Payer: COMMERCIAL

## 2020-08-14 VITALS — OXYGEN SATURATION: 100 % | TEMPERATURE: 98.7 F | SYSTOLIC BLOOD PRESSURE: 123 MMHG | DIASTOLIC BLOOD PRESSURE: 94 MMHG

## 2020-08-14 DIAGNOSIS — M65.311 TRIGGER FINGER OF RIGHT THUMB: Primary | ICD-10-CM

## 2020-08-14 PROCEDURE — 73140 X-RAY EXAM OF FINGER(S): CPT

## 2020-08-14 PROCEDURE — 74011250637 HC RX REV CODE- 250/637: Performed by: EMERGENCY MEDICINE

## 2020-08-14 PROCEDURE — 99283 EMERGENCY DEPT VISIT LOW MDM: CPT

## 2020-08-14 RX ORDER — GABAPENTIN 300 MG/1
300 CAPSULE ORAL
Status: COMPLETED | OUTPATIENT
Start: 2020-08-14 | End: 2020-08-14

## 2020-08-14 RX ORDER — GABAPENTIN 100 MG/1
100 CAPSULE ORAL 3 TIMES DAILY
Qty: 30 CAP | Refills: 0 | Status: SHIPPED | OUTPATIENT
Start: 2020-08-14 | End: 2021-04-04

## 2020-08-14 RX ADMIN — GABAPENTIN 300 MG: 300 CAPSULE ORAL at 06:17

## 2020-08-14 NOTE — ED TRIAGE NOTES
Patient c/o right thumb pain x 2 days. Denies injury. Patient states \"feels like it keeps popping out of the joint\".

## 2020-08-14 NOTE — ED PROVIDER NOTES
HPI   Patient c/o right thumb pain x 2 days. She denies injury to her right thumb. Patient states severe pain when she bends the thumb and has a popping sensation with bending. Past Medical History:   Diagnosis Date    Arthritis     Asthma     Bilateral leg pain     Carpal tunnel syndrome, right     symptoms    DDD (degenerative disc disease), lumbar     mild    Eczema     Lower back pain     Migraines     Myalgia     lumbar    Numbness and tingling in right hand     Pinched nerve     Sciatica     right lower extremity       Past Surgical History:   Procedure Laterality Date    HX PARTIAL HYSTERECTOMY      HX TONSILLECTOMY      HX TUBAL LIGATION           Family History:   Problem Relation Age of Onset    Hypertension Other     Asthma Other     Hypertension Mother    Hernandez Arthritis-osteo Mother        Social History     Socioeconomic History    Marital status:      Spouse name: Not on file    Number of children: Not on file    Years of education: Not on file    Highest education level: Not on file   Occupational History    Not on file   Social Needs    Financial resource strain: Not on file    Food insecurity     Worry: Not on file     Inability: Not on file    Transportation needs     Medical: Not on file     Non-medical: Not on file   Tobacco Use    Smoking status: Former Smoker     Last attempt to quit: 3/1/2012     Years since quittin.4    Smokeless tobacco: Never Used    Tobacco comment: less than a pack a day.   Current note from 15 states patient is a non-smoker   Substance and Sexual Activity    Alcohol use: No    Drug use: Yes     Types: Cocaine     Comment: last uses 1    Sexual activity: Not on file   Lifestyle    Physical activity     Days per week: Not on file     Minutes per session: Not on file    Stress: Not on file   Relationships    Social connections     Talks on phone: Not on file     Gets together: Not on file     Attends Anglican service: Not on file     Active member of club or organization: Not on file     Attends meetings of clubs or organizations: Not on file     Relationship status: Not on file    Intimate partner violence     Fear of current or ex partner: Not on file     Emotionally abused: Not on file     Physically abused: Not on file     Forced sexual activity: Not on file   Other Topics Concern    Not on file   Social History Narrative    Not on file         ALLERGIES: Egg; Ibuprofen; Darvocet a500 [propoxyphene n-acetaminophen]; and Fish containing products    Review of Systems   Musculoskeletal: Positive for arthralgias (right thumb) and myalgias (right thumb). Negative for gait problem and joint swelling. Skin: Negative. Vitals:    08/14/20 0517 08/14/20 0518   BP: (!) 123/94    Temp: 98.7 °F (37.1 °C)    SpO2:  100%            Physical Exam  Musculoskeletal:         General: No swelling (right thumb) or deformity. Comments: RIGHT THUMB: no edema, erythema, palpable nodule, normal pulses and sensory. ROM: (+) severe pain and a snapping motion with movement. RIGHT WRIST: normal ROM, pulses and sensory. RIGHT ELBOW: normal ROM, pulses and sensory. MDM  Number of Diagnoses or Management Options     Amount and/or Complexity of Data Reviewed  Tests in the radiology section of CPT®: ordered and reviewed    Risk of Complications, Morbidity, and/or Mortality  Presenting problems: low  Diagnostic procedures: low  Management options: low        Procedures    Dif Dx: trigger finger, arthritis, tendonitis, bursititis    Dx: trigger finger    Disp: F/U ortho in 3 to 4 days. Gabapentin 300 mg. Wear split prn. Return to ER prn. Dictation disclaimer:  Please note that this dictation was completed with [employer], the [employer] voice recognition software. Quite often unanticipated grammatical, syntax, homophones, and other interpretive errors are inadvertently transcribed by the computer software.   Please disregard these errors. Please excuse any errors that have escaped final proofreading.

## 2020-08-14 NOTE — DISCHARGE INSTRUCTIONS
Trigger Finger: Care Instructions  Overview  A trigger finger is a finger stuck in a bent position. It happens when the tendon that bends and straightens the thumb or finger can't slide smoothly under the ligaments that hold the tendon against the bones. In most cases, it's caused by a bump (nodule) that forms on the tendon. The bent finger usually straightens out on its own. A trigger finger can be painful. But it normally isn't a serious problem. Trigger fingers seem to occur more in some groups of people. These groups include:  · People who have diabetes or arthritis. · People who have injured their hands in the past.  · Musicians. · People who  tools often. Rest and exercises may help your finger relax so that it can bend. You may get a corticosteroid shot. This can reduce swelling and pain. Your doctor may put a splint on your finger. It will give your finger some rest. You may need surgery if the finger keeps locking in a bent position. Follow-up care is a key part of your treatment and safety. Be sure to make and go to all appointments, and call your doctor if you are having problems. It's also a good idea to know your test results and keep a list of the medicines you take. How can you care for yourself at home? · If your doctor put a splint on your finger, wear it as directed. Don't take it off until your doctor says you can. · You may need to change your activities to avoid movements that irritate the finger. · Take your medicines exactly as prescribed. Call your doctor if you think you are having a problem with your medicine. · Ask your doctor if you can take an over-the-counter pain medicine, such as acetaminophen (Tylenol), ibuprofen (Advil, Motrin), or naproxen (Aleve). Be safe with medicines. Read and follow all instructions on the label. · If your doctor recommends exercises, do them as directed. When should you call for help?    Call your doctor now or seek immediate medical care if:  · Your finger locks in a bent position and will not straighten. Watch closely for changes in your health, and be sure to contact your doctor if:  · You do not get better as expected. Where can you learn more? Go to http://marcial-karmen.info/  Enter M826 in the search box to learn more about \"Trigger Finger: Care Instructions. \"  Current as of: March 2, 2020               Content Version: 12.5  © 2006-2020 [employer]. Care instructions adapted under license by [employer] (which disclaims liability or warranty for this information). If you have questions about a medical condition or this instruction, always ask your healthcare professional. [address] any warranty or liability for your use of this information. Trigger Finger: Care Instructions  Overview  A trigger finger is a finger stuck in a bent position. It happens when the tendon that bends and straightens the thumb or finger can't slide smoothly under the ligaments that hold the tendon against the bones. In most cases, it's caused by a bump (nodule) that forms on the tendon. The bent finger usually straightens out on its own. A trigger finger can be painful. But it normally isn't a serious problem. Trigger fingers seem to occur more in some groups of people. These groups include:  · People who have diabetes or arthritis. · People who have injured their hands in the past.  · Musicians. · People who  tools often. Rest and exercises may help your finger relax so that it can bend. You may get a corticosteroid shot. This can reduce swelling and pain. Your doctor may put a splint on your finger. It will give your finger some rest. You may need surgery if the finger keeps locking in a bent position. Follow-up care is a key part of your treatment and safety. Be sure to make and go to all appointments, and call your doctor if you are having problems.  It's also a good idea to know your test results and keep a list of the medicines you take. How can you care for yourself at home? · If your doctor put a splint on your finger, wear it as directed. Don't take it off until your doctor says you can. · You may need to change your activities to avoid movements that irritate the finger. · Take your medicines exactly as prescribed. Call your doctor if you think you are having a problem with your medicine. · Ask your doctor if you can take an over-the-counter pain medicine, such as acetaminophen (Tylenol), ibuprofen (Advil, Motrin), or naproxen (Aleve). Be safe with medicines. Read and follow all instructions on the label. · If your doctor recommends exercises, do them as directed. When should you call for help? Call your doctor now or seek immediate medical care if:  · Your finger locks in a bent position and will not straighten. Watch closely for changes in your health, and be sure to contact your doctor if:  · You do not get better as expected. Where can you learn more? Go to http://marcial-karmen.info/  Enter M826 in the search box to learn more about \"Trigger Finger: Care Instructions. \"  Current as of: March 2, 2020               Content Version: 12.5  © 2566-0500 [employer]. Care instructions adapted under license by [employer] (which disclaims liability or warranty for this information). If you have questions about a medical condition or this instruction, always ask your healthcare professional. [place] [address] any warranty or liability for your use of this information. Patient Education        Trigger Finger: Care Instructions  Overview  A trigger finger is a finger stuck in a bent position. It happens when the tendon that bends and straightens the thumb or finger can't slide smoothly under the ligaments that hold the tendon against the bones.  In most cases, it's caused by a bump (nodule) that forms on the tendon. The bent finger usually straightens out on its own. A trigger finger can be painful. But it normally isn't a serious problem. Trigger fingers seem to occur more in some groups of people. These groups include:  · People who have diabetes or arthritis. · People who have injured their hands in the past.  · Musicians. · People who  tools often. Rest and exercises may help your finger relax so that it can bend. You may get a corticosteroid shot. This can reduce swelling and pain. Your doctor may put a splint on your finger. It will give your finger some rest. You may need surgery if the finger keeps locking in a bent position. Follow-up care is a key part of your treatment and safety. Be sure to make and go to all appointments, and call your doctor if you are having problems. It's also a good idea to know your test results and keep a list of the medicines you take. How can you care for yourself at home? · If your doctor put a splint on your finger, wear it as directed. Don't take it off until your doctor says you can. · You may need to change your activities to avoid movements that irritate the finger. · Take your medicines exactly as prescribed. Call your doctor if you think you are having a problem with your medicine. · Ask your doctor if you can take an over-the-counter pain medicine, such as acetaminophen (Tylenol), ibuprofen (Advil, Motrin), or naproxen (Aleve). Be safe with medicines. Read and follow all instructions on the label. · If your doctor recommends exercises, do them as directed. When should you call for help? Call your doctor now or seek immediate medical care if:  · Your finger locks in a bent position and will not straighten. Watch closely for changes in your health, and be sure to contact your doctor if:  · You do not get better as expected. Where can you learn more?   Go to http://marcial-karmen.info/  Enter M826 in the search box to learn more about \"Trigger Finger: Care Instructions. \"  Current as of: March 2, 2020               Content Version: 12.5  © 7690-2533 [employer]wise, Incorporated. Care instructions adapted under license by [employer] (which disclaims liability or warranty for this information). If you have questions about a medical condition or this instruction, always ask your healthcare professional. [place] [address] any warranty or liability for your use of this information.

## 2020-10-29 ENCOUNTER — HOSPITAL ENCOUNTER (EMERGENCY)
Age: 55
Discharge: HOME | End: 2020-10-29
Attending: EMERGENCY MEDICINE
Payer: COMMERCIAL

## 2020-10-29 VITALS
RESPIRATION RATE: 16 BRPM | HEART RATE: 98 BPM | SYSTOLIC BLOOD PRESSURE: 124 MMHG | DIASTOLIC BLOOD PRESSURE: 67 MMHG | OXYGEN SATURATION: 98 % | TEMPERATURE: 98.7 F

## 2020-10-29 DIAGNOSIS — N30.00 ACUTE CYSTITIS WITHOUT HEMATURIA: ICD-10-CM

## 2020-10-29 DIAGNOSIS — A59.01 TRICHOMONAL VAGINITIS: ICD-10-CM

## 2020-10-29 DIAGNOSIS — A64 STI (SEXUALLY TRANSMITTED INFECTION): Primary | ICD-10-CM

## 2020-10-29 LAB
APPEARANCE UR: ABNORMAL
BILIRUB UR QL: NEGATIVE
COLOR UR: YELLOW
EPITH CASTS URNS QL MICRO: ABNORMAL /LPF (ref 0–5)
GLUCOSE UR STRIP.AUTO-MCNC: NEGATIVE MG/DL
HGB UR QL STRIP: ABNORMAL
KETONES UR QL STRIP.AUTO: NEGATIVE MG/DL
LEUKOCYTE ESTERASE UR QL STRIP.AUTO: ABNORMAL
MUCOUS THREADS URNS QL MICRO: ABNORMAL /LPF
NITRITE UR QL STRIP.AUTO: NEGATIVE
PH UR STRIP: 5 [PH] (ref 5–8)
PROT UR STRIP-MCNC: NEGATIVE MG/DL
RBC #/AREA URNS HPF: ABNORMAL /HPF (ref 0–5)
SERVICE CMNT-IMP: NORMAL
SP GR UR REFRACTOMETRY: 1.02 (ref 1–1.03)
TRICHOMONAS UR QL MICRO: ABNORMAL
UROBILINOGEN UR QL STRIP.AUTO: 1 EU/DL (ref 0.2–1)
WBC URNS QL MICRO: ABNORMAL /HPF (ref 0–4)
WET PREP GENITAL: NORMAL

## 2020-10-29 PROCEDURE — 96372 THER/PROPH/DIAG INJ SC/IM: CPT

## 2020-10-29 PROCEDURE — 87491 CHLMYD TRACH DNA AMP PROBE: CPT

## 2020-10-29 PROCEDURE — 81001 URINALYSIS AUTO W/SCOPE: CPT

## 2020-10-29 PROCEDURE — 74011250637 HC RX REV CODE- 250/637: Performed by: EMERGENCY MEDICINE

## 2020-10-29 PROCEDURE — 99284 EMERGENCY DEPT VISIT MOD MDM: CPT

## 2020-10-29 PROCEDURE — 74011250636 HC RX REV CODE- 250/636: Performed by: EMERGENCY MEDICINE

## 2020-10-29 PROCEDURE — 87210 SMEAR WET MOUNT SALINE/INK: CPT

## 2020-10-29 PROCEDURE — 74011000250 HC RX REV CODE- 250: Performed by: EMERGENCY MEDICINE

## 2020-10-29 RX ORDER — ACETAMINOPHEN 325 MG/1
650 TABLET ORAL
Qty: 20 TAB | Refills: 0 | Status: SHIPPED | OUTPATIENT
Start: 2020-10-29 | End: 2021-04-04

## 2020-10-29 RX ORDER — CEFTRIAXONE 250 MG/8ML
250 INJECTION, POWDER, FOR SOLUTION INTRAMUSCULAR; INTRAVENOUS ONCE
Status: DISCONTINUED | OUTPATIENT
Start: 2020-10-29 | End: 2020-10-29 | Stop reason: CLARIF

## 2020-10-29 RX ORDER — DOXYCYCLINE HYCLATE 100 MG
100 TABLET ORAL 2 TIMES DAILY
Qty: 20 TAB | Refills: 0 | Status: SHIPPED | OUTPATIENT
Start: 2020-10-29 | End: 2020-11-08

## 2020-10-29 RX ORDER — CEPHALEXIN 500 MG/1
500 CAPSULE ORAL 3 TIMES DAILY
Qty: 21 CAP | Refills: 0 | Status: SHIPPED | OUTPATIENT
Start: 2020-10-29 | End: 2020-11-05

## 2020-10-29 RX ORDER — TRIAMCINOLONE ACETONIDE 0.25 MG/ML
LOTION TOPICAL 2 TIMES DAILY
Qty: 60 ML | Refills: 0 | Status: SHIPPED | OUTPATIENT
Start: 2020-10-29 | End: 2021-04-04

## 2020-10-29 RX ORDER — METRONIDAZOLE 500 MG/1
500 TABLET ORAL 2 TIMES DAILY
Qty: 20 TAB | Refills: 0 | Status: SHIPPED | OUTPATIENT
Start: 2020-10-29 | End: 2020-11-08

## 2020-10-29 RX ORDER — ACETAMINOPHEN 325 MG/1
650 TABLET ORAL
Status: COMPLETED | OUTPATIENT
Start: 2020-10-29 | End: 2020-10-29

## 2020-10-29 RX ADMIN — LIDOCAINE HYDROCHLORIDE 250 MG: 10 INJECTION, SOLUTION EPIDURAL; INFILTRATION; INTRACAUDAL; PERINEURAL at 08:50

## 2020-10-29 RX ADMIN — ACETAMINOPHEN 650 MG: 325 TABLET ORAL at 08:49

## 2020-10-29 NOTE — DISCHARGE INSTRUCTIONS
Patient Education        Urinary Tract Infection in Women: Care Instructions  Your Care Instructions     A urinary tract infection, or UTI, is a general term for an infection anywhere between the kidneys and the urethra (where urine comes out). Most UTIs are bladder infections. They often cause pain or burning when you urinate. UTIs are caused by bacteria and can be cured with antibiotics. Be sure to complete your treatment so that the infection goes away. Follow-up care is a key part of your treatment and safety. Be sure to make and go to all appointments, and call your doctor if you are having problems. It's also a good idea to know your test results and keep a list of the medicines you take. How can you care for yourself at home? · Take your antibiotics as directed. Do not stop taking them just because you feel better. You need to take the full course of antibiotics. · Drink extra water and other fluids for the next day or two. This may help wash out the bacteria that are causing the infection. (If you have kidney, heart, or liver disease and have to limit fluids, talk with your doctor before you increase your fluid intake.)  · Avoid drinks that are carbonated or have caffeine. They can irritate the bladder. · Urinate often. Try to empty your bladder each time. · To relieve pain, take a hot bath or lay a heating pad set on low over your lower belly or genital area. Never go to sleep with a heating pad in place. To prevent UTIs  · Drink plenty of water each day. This helps you urinate often, which clears bacteria from your system. (If you have kidney, heart, or liver disease and have to limit fluids, talk with your doctor before you increase your fluid intake.)  · Urinate when you need to. · Urinate right after you have sex. · Change sanitary pads often. · Avoid douches, bubble baths, feminine hygiene sprays, and other feminine hygiene products that have deodorants.   · After going to the bathroom, wipe from front to back. When should you call for help? Call your doctor now or seek immediate medical care if:    · Symptoms such as fever, chills, nausea, or vomiting get worse or appear for the first time.     · You have new pain in your back just below your rib cage. This is called flank pain.     · There is new blood or pus in your urine.     · You have any problems with your antibiotic medicine. Watch closely for changes in your health, and be sure to contact your doctor if:    · You are not getting better after taking an antibiotic for 2 days.     · Your symptoms go away but then come back. Where can you learn more? Go to http://www.gray.com/  Enter B808 in the search box to learn more about \"Urinary Tract Infection in Women: Care Instructions. \"  Current as of: June 29, 2020               Content Version: 12.6  © 5917-6407 "[employer]". Care instructions adapted under license by [employer] (which disclaims liability or warranty for this information). If you have questions about a medical condition or this instruction, always ask your healthcare professional. [place] [address] any warranty or liability for your use of this information. Patient Education        Trichomoniasis: Care Instructions  Your Care Instructions  Trichomoniasis is a sexually transmitted infection (STI) that is spread by having sex with an infected partner. Trichomoniasis is commonly called trich (say \"trick\"). In women, trich may cause vaginal itching and a smelly discharge. But in many cases, especially in men, there are no symptoms. Casandra Remedies is treated so that you do not spread it to others. Both you and your sex partner or partners should be treated at the same time so you do not infect each other again. Trich may cause problems with pregnancy. Your doctor will talk with you about treatment for Trich if you are pregnant.   Follow-up care is a key part of your treatment and safety. Be sure to make and go to all appointments, and call your doctor if you are having problems. It's also a good idea to know your test results and keep a list of the medicines you take. How can you care for yourself at home? · Take your antibiotics as directed. Do not stop taking them just because you feel better. You need to take the full course of antibiotics. · Do not have sex while you are being treated. If your doctor gave you a single dose of antibiotics, do not have sex for one week after being treated and until your partner also has been treated. · Tell your sex partner (or partners) that he or she will also need to be tested and treated. · Use a cold water compress or cool baths to relieve itching. To prevent trichomoniasis in the future  · Use latex condoms every time you have sex. Use them from the beginning to the end of sexual contact. · Talk to your partner before having sex. Find out if he or she has or is at risk for trich or any other STI. Keep in mind that a person may be able to spread an STI even if he or she does not have symptoms. · Do not have sex if you are being treated for trich or any other STI. · Do not have sex with anyone who has symptoms of an STI, such as sores on the genitals or mouth. · Having one sex partner (who does not have STIs and does not have sex with anyone else) is a good way to avoid STIs. When should you call for help? Call your doctor now or seek immediate medical care if:    · You have unusual vaginal bleeding.     · You have a fever.     · You have new discharge from the vagina or penis.     · You have pelvic pain. Watch closely for changes in your health, and be sure to contact your doctor if:    · You do not get better as expected.     · You have any new symptoms or your symptoms get worse. Where can you learn more?   Go to http://www.gray.com/  Enter W796 in the search box to learn more about \"Trichomoniasis: Care Instructions. \"  Current as of: February 26, 2020               Content Version: 12.6  © 7807-2985 [employer]. Care instructions adapted under license by [employer] (which disclaims liability or warranty for this information). If you have questions about a medical condition or this instruction, always ask your healthcare professional. [address] any warranty or liability for your use of this information.

## 2020-10-29 NOTE — ED PROVIDER NOTES
EMERGENCY DEPARTMENT HISTORY AND PHYSICAL EXAM    8:08 AM      Date: 10/29/2020  Patient Name: Adam Daniel    History of Presenting Illness     Chief Complaint   Patient presents with    Exposure to STD         History Provided By: Patient  Location/Duration/Severity/Modifying factors   Patient is a 62-year-old female with a history of asthma, eczema, chronic back pain, syphilis with treatment while incarcerated, tubal ligation, hysterectomy, the presents emergency department with complaint of 1 month of increasing pelvic cramping and yellow foul-smelling vaginal discharge. Patient has a history of STDs in the past and has attributed that to intercourse with her . Patient recently was released from [facility]-way and came home started having intercourse approximately 2 weeks after that. Patient subsequently developed vaginal discharge which has been going on for a month and was hoping it would improve. The patient has noted that the discharge has been worsening and now would like to be evaluated for. The patient denies having health insurance at this time which is delayed her from getting care for her condition. Patient denies any flank pain, change in appetite, fevers, chills, diarrhea, or constipation. Patient admits to being a smoker, drinker, and denies any drug use. The patient is not working at this time. Patient denies any external lesions. Patient does note that she has pain with intercourse. PCP: None    Current Facility-Administered Medications   Medication Dose Route Frequency Provider Last Rate Last Dose    cefTRIAXone (ROCEPHIN) 250 mg in lidocaine (PF) (XYLOCAINE) 10 mg/mL (1 %) IM injection  250 mg IntraMUSCular ONCE Jake Zarco MD         Current Outpatient Medications   Medication Sig Dispense Refill    metroNIDAZOLE (FlagyL) 500 mg tablet Take 1 Tab by mouth two (2) times a day for 10 days.  20 Tab 0    cephALEXin (Keflex) 500 mg capsule Take 1 Cap by mouth three (3) times daily for 7 days. 21 Cap 0    doxycycline (VIBRA-TABS) 100 mg tablet Take 1 Tab by mouth two (2) times a day for 10 days. 20 Tab 0    acetaminophen (TYLENOL) 325 mg tablet Take 2 Tabs by mouth every four (4) hours as needed for Pain. 20 Tab 0    triamcinolone acetonide 0.025 % lotion Apply  to affected area two (2) times a day. 60 mL 0    gabapentin (Neurontin) 100 mg capsule Take 1 Cap by mouth three (3) times daily. Max Daily Amount: 300 mg. 30 Cap 0       Past History     Past Medical History:  Past Medical History:   Diagnosis Date    Arthritis     Asthma     Bilateral leg pain     Carpal tunnel syndrome, right     symptoms    DDD (degenerative disc disease), lumbar     mild    Eczema     Infectious disease     sphills     Lower back pain     Migraines     Myalgia     lumbar    Numbness and tingling in right hand     Pinched nerve     Sciatica     right lower extremity       Past Surgical History:  Past Surgical History:   Procedure Laterality Date    HX PARTIAL HYSTERECTOMY      HX TONSILLECTOMY      HX TUBAL LIGATION         Family History:  Family History   Problem Relation Age of Onset    Hypertension Other     Asthma Other     Hypertension Mother    [address] [place] [address] Arthritis-osteo Mother        Social History:  Social History     Tobacco Use    Smoking status: Former Smoker     Last attempt to quit: 3/1/2012     Years since quittin.6    Smokeless tobacco: Never Used    Tobacco comment: less than a pack a day. Current note from 4--15 states patient is a non-smoker   Substance Use Topics    Alcohol use: No    Drug use: Not Currently     Types: Cocaine     Comment: last uses        Allergies: Allergies   Allergen Reactions    Egg Anaphylaxis    Ibuprofen Hives    Darvocet A500 [Propoxyphene N-Acetaminophen] Hives    Fish Containing Products Hives         Review of Systems       Review of Systems   Constitutional: Negative for activity change, fatigue and fever.    HENT: Negative for congestion and rhinorrhea. Eyes: Negative for visual disturbance. Respiratory: Negative for shortness of breath. Cardiovascular: Negative for chest pain and palpitations. Gastrointestinal: Negative for abdominal pain, diarrhea, nausea and vomiting. Genitourinary: Positive for dyspareunia, dysuria, pelvic pain, vaginal discharge and vaginal pain. Negative for hematuria. Musculoskeletal: Negative for back pain. Skin: Negative for rash. Neurological: Negative for dizziness, weakness and light-headedness. All other systems reviewed and are negative. Physical Exam   There were no vitals taken for this visit. Physical Exam  Vitals signs and nursing note reviewed. Constitutional:       General: She is not in acute distress. Appearance: She is well-developed. HENT:      Head: Normocephalic and atraumatic. Right Ear: External ear normal.      Left Ear: External ear normal.      Nose: Nose normal.   Eyes:      General: No scleral icterus. Conjunctiva/sclera: Conjunctivae normal.      Pupils: Pupils are equal, round, and reactive to light. Neck:      Musculoskeletal: Normal range of motion and neck supple. Thyroid: No thyromegaly. Vascular: No JVD. Trachea: No tracheal deviation. Cardiovascular:      Rate and Rhythm: Normal rate and regular rhythm. Heart sounds: Normal heart sounds. No murmur. No friction rub. No gallop. Pulmonary:      Effort: Pulmonary effort is normal.      Breath sounds: Normal breath sounds. Chest:      Chest wall: No tenderness. Abdominal:      General: Bowel sounds are normal. There is no distension. Palpations: Abdomen is soft. Tenderness: There is no abdominal tenderness. There is no guarding or rebound. Comments: Suprapubic pain without guarding   Musculoskeletal: Normal range of motion. General: No tenderness. Lymphadenopathy:      Cervical: No cervical adenopathy.    Skin: General: Skin is warm and dry. Neurological:      Mental Status: She is alert and oriented to person, place, and time. Cranial Nerves: No cranial nerve deficit. Coordination: Coordination normal.      Comments: No sensory loss, Gait normal, Motor 5/5   Psychiatric:         Behavior: Behavior normal.         Thought Content: Thought content normal.         Judgment: Judgment normal.           Diagnostic Study Results     Labs -  Recent Results (from the past 12 hour(s))   WET PREP    Collection Time: 10/29/20  8:15 AM    Specimen: Endocervix   Result Value Ref Range    Special Requests: NO SPECIAL REQUESTS      Wet prep        FEW  CLUE CELLS PRESENT  FEW  MOTILE TRICHOMONAS NOTED  NO YEAST SEEN     URINALYSIS W/ RFLX MICROSCOPIC    Collection Time: 10/29/20  8:15 AM   Result Value Ref Range    Color YELLOW      Appearance CLOUDY      Specific gravity 1.017 1.005 - 1.030      pH (UA) 5.0 5.0 - 8.0      Protein Negative NEG mg/dL    Glucose Negative NEG mg/dL    Ketone Negative NEG mg/dL    Bilirubin Negative NEG      Blood MODERATE (A) NEG      Urobilinogen 1.0 0.2 - 1.0 EU/dL    Nitrites Negative NEG      Leukocyte Esterase LARGE (A) NEG     URINE MICROSCOPIC ONLY    Collection Time: 10/29/20  8:15 AM   Result Value Ref Range    WBC 36 to 50 0 - 4 /hpf    RBC 21 to 35 0 - 5 /hpf    Epithelial cells 2+ 0 - 5 /lpf    Mucus FEW (A) NEG /lpf    Trichomonas FEW (A) NEG         Radiologic Studies -   No orders to display         Medical Decision Making   I am the first provider for this patient. I reviewed the vital signs, available nursing notes, past medical history, past surgical history, family history and social history. Vital Signs-Reviewed the patient's vital signs. Records Reviewed: Nursing Notes and Old Medical Records (Time of Review: 8:08 AM)    ED Course: Progress Notes, Reevaluation, and Consults: The patient has trichomonas and evidence of a UTI.   The patient is also asking for a refill on her triamcinolone lotion for her eczema. I suspect the patient has a pelvic infection and will treat with a longer course of doxycycline, Flagyl as she also has bacterial vaginosis with trichomonas, and Keflex for her UTI, and some Tylenol for her pain. The patient will follow with the [place] [address] clinic and have educated on signs of worsening and will return if at all worsened or concerned. Workup and recommendations were reviewed with the patient and all questions were answered. The patient understands the plan and will proceed with close outpatient care. I have encouraged the patient to return if at all worsened or concerned. Rodrigo Youssef DO 9:13 AM      Provider Notes (Medical Decision Making):   MDM  Number of Diagnoses or Management Options  Diagnosis management comments: Patient is a 82-year-old female with a history of syphilis treated in custodial, STI in the past, the presents emergency department with complaint of 1 month of vaginal discharge since returning from custodial and attributes this to contact with her . Patient is afebrile has some mild suprapubic pain also has a history of a hysterectomy. The patient's appetite is unchanged and will perform a pelvic exam, send swabs, plan to treat empirically for gonorrhea and chlamydia, and reevaluate. Rodrigo Youssef DO 8:11 AM        Pelvic Exam    Date/Time: 10/29/2020 8:26 AM  Performed by: attending  Procedure duration:  5 minutes. Documented by: Santy. Type of exam performed: bimanual and speculum. External genitalia appearance: normal.    Vaginal exam:  discharge, inflammation and odor. Cervix assessment: Cervix not palpated  Or visualized. Specimen(s) collected:  chlamydia and GC (Wet prep). Bimanual exam:  normal.    Patient tolerance: Patient tolerated the procedure well with no immediate complications  Comments: Chaperoned by Bhavna Veras RN            Diagnosis     Clinical Impression:   1.  STI (sexually transmitted infection)    2. Trichomonal vaginitis    3. Acute cystitis without hematuria        Disposition: DC    Follow-up Information     Follow up With Specialties Details Why [address]  In 1 day  2019 [address] EMERGENCY DEPT Emergency Medicine  As needed, If symptoms worsen [address] [facility]  [phone]           Patient's Medications   Start Taking    ACETAMINOPHEN (TYLENOL) 325 MG TABLET    Take 2 Tabs by mouth every four (4) hours as needed for Pain. CEPHALEXIN (KEFLEX) 500 MG CAPSULE    Take 1 Cap by mouth three (3) times daily for 7 days. DOXYCYCLINE (VIBRA-TABS) 100 MG TABLET    Take 1 Tab by mouth two (2) times a day for 10 days. METRONIDAZOLE (FLAGYL) 500 MG TABLET    Take 1 Tab by mouth two (2) times a day for 10 days. TRIAMCINOLONE ACETONIDE 0.025 % LOTION    Apply  to affected area two (2) times a day. Continue Taking    GABAPENTIN (NEURONTIN) 100 MG CAPSULE    Take 1 Cap by mouth three (3) times daily. Max Daily Amount: 300 mg. These Medications have changed    No medications on file   Stop Taking    ESCITALOPRAM OXALATE (LEXAPRO) 20 MG TABLET    Take 20 mg by mouth daily. GEMFIBROZIL (LOPID) 600 MG TABLET    Take 600 mg by mouth two (2) times a day. HYDROXYZINE HCL (ATARAX) 50 MG TABLET    Take 50 mg by mouth three (3) times daily as needed for Itching. MIRTAZAPINE (REMERON) 30 MG TABLET    Take 30 mg by mouth nightly. PREDNISONE (DELTASONE) 5 MG TABLET    Take  by mouth daily. PREDNISONE (DELTASONE) 5 MG TABLET    Take 1 tablet daily until finished    TOPIRAMATE (TOPAMAX) 200 MG TABLET    Take  by mouth two (2) times a day. TRIAMCINOLONE ACETONIDE (KENALOG) 0.5 % OINTMENT    Apply  to affected area two (2) times a day. use thin layer     Disclaimer: Sections of this note are dictated using utilizing voice recognition software.   Minor typographical errors may be present. If questions arise, please do not hesitate to contact me or call our department.

## 2020-10-29 NOTE — ED NOTES
Ayo Lechuga is a 48 y.o. female that was discharged in good condition. The patients diagnosis, condition and treatment were explained to  patient and aftercare instructions were given. The patient verbalized understanding. Patient armband removed and shredded.

## 2020-11-02 LAB
C TRACH RRNA SPEC QL NAA+PROBE: NEGATIVE
N GONORRHOEA RRNA SPEC QL NAA+PROBE: NEGATIVE
SPECIMEN SOURCE: NORMAL

## 2021-02-16 ENCOUNTER — HOSPITAL ENCOUNTER (EMERGENCY)
Age: 56
Discharge: HOME | End: 2021-02-16
Attending: EMERGENCY MEDICINE

## 2021-02-16 VITALS
OXYGEN SATURATION: 96 % | DIASTOLIC BLOOD PRESSURE: 86 MMHG | SYSTOLIC BLOOD PRESSURE: 126 MMHG | HEART RATE: 102 BPM | RESPIRATION RATE: 17 BRPM | TEMPERATURE: 98.3 F

## 2021-02-16 DIAGNOSIS — E87.6 HYPOKALEMIA: ICD-10-CM

## 2021-02-16 DIAGNOSIS — F14.29 COCAINE DEPENDENCE WITH COCAINE-INDUCED DISORDER (HCC): Primary | ICD-10-CM

## 2021-02-16 LAB
ALBUMIN SERPL-MCNC: 3.5 G/DL (ref 3.4–5)
ALBUMIN/GLOB SERPL: 0.8 {RATIO} (ref 0.8–1.7)
ALP SERPL-CCNC: 115 U/L (ref 45–117)
ALT SERPL-CCNC: 20 U/L (ref 13–56)
AMPHET UR QL SCN: NEGATIVE
ANION GAP SERPL CALC-SCNC: ABNORMAL MMOL/L (ref 3–18)
AST SERPL-CCNC: 18 U/L (ref 10–38)
ATRIAL RATE: 92 BPM
BARBITURATES UR QL SCN: NEGATIVE
BASOPHILS # BLD: 0 K/UL (ref 0–0.1)
BASOPHILS NFR BLD: 0 % (ref 0–2)
BENZODIAZ UR QL: NEGATIVE
BILIRUB SERPL-MCNC: 0.3 MG/DL (ref 0.2–1)
BUN SERPL-MCNC: 7 MG/DL (ref 7–18)
BUN/CREAT SERPL: 9 (ref 12–20)
CALCIUM SERPL-MCNC: 9.2 MG/DL (ref 8.5–10.1)
CALCULATED P AXIS, ECG09: 57 DEGREES
CALCULATED R AXIS, ECG10: -16 DEGREES
CALCULATED T AXIS, ECG11: 33 DEGREES
CANNABINOIDS UR QL SCN: NEGATIVE
CHLORIDE SERPL-SCNC: 110 MMOL/L (ref 100–111)
CO2 SERPL-SCNC: 27 MMOL/L (ref 21–32)
COCAINE UR QL SCN: POSITIVE
CREAT SERPL-MCNC: 0.79 MG/DL (ref 0.6–1.3)
DIAGNOSIS, 93000: NORMAL
DIFFERENTIAL METHOD BLD: ABNORMAL
EOSINOPHIL # BLD: 0.2 K/UL (ref 0–0.4)
EOSINOPHIL NFR BLD: 2 % (ref 0–5)
ERYTHROCYTE [DISTWIDTH] IN BLOOD BY AUTOMATED COUNT: 12.6 % (ref 11.6–14.5)
ETHANOL SERPL-MCNC: <3 MG/DL (ref 0–3)
GLOBULIN SER CALC-MCNC: 4.4 G/DL (ref 2–4)
GLUCOSE SERPL-MCNC: 81 MG/DL (ref 74–99)
HCT VFR BLD AUTO: 39.9 % (ref 35–45)
HDSCOM,HDSCOM: ABNORMAL
HGB BLD-MCNC: 13.5 G/DL (ref 12–16)
LYMPHOCYTES # BLD: 3.7 K/UL (ref 0.9–3.6)
LYMPHOCYTES NFR BLD: 34 % (ref 21–52)
MCH RBC QN AUTO: 30.5 PG (ref 24–34)
MCHC RBC AUTO-ENTMCNC: 33.8 G/DL (ref 31–37)
MCV RBC AUTO: 90.3 FL (ref 74–97)
METHADONE UR QL: NEGATIVE
MONOCYTES # BLD: 0.6 K/UL (ref 0.05–1.2)
MONOCYTES NFR BLD: 6 % (ref 3–10)
NEUTS SEG # BLD: 6.2 K/UL (ref 1.8–8)
NEUTS SEG NFR BLD: 58 % (ref 40–73)
OPIATES UR QL: NEGATIVE
P-R INTERVAL, ECG05: 182 MS
PCP UR QL: NEGATIVE
PLATELET # BLD AUTO: 219 K/UL (ref 135–420)
PMV BLD AUTO: 11.2 FL (ref 9.2–11.8)
POTASSIUM SERPL-SCNC: 3.2 MMOL/L (ref 3.5–5.5)
PROT SERPL-MCNC: 7.9 G/DL (ref 6.4–8.2)
Q-T INTERVAL, ECG07: 400 MS
QRS DURATION, ECG06: 78 MS
QTC CALCULATION (BEZET), ECG08: 494 MS
RBC # BLD AUTO: 4.42 M/UL (ref 4.2–5.3)
SODIUM SERPL-SCNC: 136 MMOL/L (ref 136–145)
VENTRICULAR RATE, ECG03: 92 BPM
WBC # BLD AUTO: 10.7 K/UL (ref 4.6–13.2)

## 2021-02-16 PROCEDURE — 93005 ELECTROCARDIOGRAM TRACING: CPT

## 2021-02-16 PROCEDURE — 82077 ASSAY SPEC XCP UR&BREATH IA: CPT

## 2021-02-16 PROCEDURE — 85025 COMPLETE CBC W/AUTO DIFF WBC: CPT

## 2021-02-16 PROCEDURE — 80307 DRUG TEST PRSMV CHEM ANLYZR: CPT

## 2021-02-16 PROCEDURE — 74011250637 HC RX REV CODE- 250/637: Performed by: EMERGENCY MEDICINE

## 2021-02-16 PROCEDURE — 80053 COMPREHEN METABOLIC PANEL: CPT

## 2021-02-16 PROCEDURE — 99282 EMERGENCY DEPT VISIT SF MDM: CPT

## 2021-02-16 RX ADMIN — POTASSIUM BICARBONATE 20 MEQ: 782 TABLET, EFFERVESCENT ORAL at 07:58

## 2021-02-16 NOTE — ED PROVIDER NOTES
HPI     Patient with past medical history of asthma, eczema, crack cocaine use presents from home stating that she wants to quit. Patient states that she does not want detox but she wants to be somewhere where \"they lock her up\"  Last time she used crack cocaine was about 2 hours ago. Patient states that she feels \"edgy'. Denies any shortness of breath, chest pain, fever. Denies any suicidal homicidal ideation. Past Medical History:   Diagnosis Date    Arthritis     Asthma     Bilateral leg pain     Carpal tunnel syndrome, right     symptoms    DDD (degenerative disc disease), lumbar     mild    Eczema     Infectious disease     sphills     Lower back pain     Migraines     Myalgia     lumbar    Numbness and tingling in right hand     Pinched nerve     Sciatica     right lower extremity       Past Surgical History:   Procedure Laterality Date    HX PARTIAL HYSTERECTOMY      HX TONSILLECTOMY      HX TUBAL LIGATION           Family History:   Problem Relation Age of Onset    Hypertension Other     Asthma Other     Hypertension Mother    [facility] Arthritis-osteo Mother        Social History     Socioeconomic History    Marital status:      Spouse name: Not on file    Number of children: Not on file    Years of education: Not on file    Highest education level: Not on file   Occupational History    Not on file   Social Needs    Financial resource strain: Not on file    Food insecurity     Worry: Not on file     Inability: Not on file    Transportation needs     Medical: Not on file     Non-medical: Not on file   Tobacco Use    Smoking status: Former Smoker     Quit date: 3/1/2012     Years since quittin.9    Smokeless tobacco: Never Used    Tobacco comment: less than a pack a day.   Current note from 4-1-15 states patient is a non-smoker   Substance and Sexual Activity    Alcohol use: No    Drug use: Not Currently     Types: Cocaine     Comment: last uses 1    Sexual activity: Not on file   Lifestyle    Physical activity     Days per week: Not on file     Minutes per session: Not on file    Stress: Not on file   Relationships    Social connections     Talks on phone: Not on file     Gets together: Not on file     Attends Hoahaoism service: Not on file     Active member of club or organization: Not on file     Attends meetings of clubs or organizations: Not on file     Relationship status: Not on file    Intimate partner violence     Fear of current or ex partner: Not on file     Emotionally abused: Not on file     Physically abused: Not on file     Forced sexual activity: Not on file   Other Topics Concern    Not on file   Social History Narrative    Not on file         ALLERGIES: Egg, Ibuprofen, Darvocet a500 [propoxyphene n-acetaminophen], and Fish containing products    Review of Systems   Constitutional: Negative for activity change, appetite change and chills. HENT: Negative for congestion, ear discharge, ear pain and sore throat. Eyes: Negative for photophobia and pain. Respiratory: Negative for cough and choking. Cardiovascular: Negative for palpitations and leg swelling. Gastrointestinal: Negative for anal bleeding and rectal pain. Endocrine: Negative for polydipsia and polyuria. Genitourinary: Negative for genital sores and urgency. Musculoskeletal: Negative for arthralgias and myalgias. Neurological: Negative for dizziness, seizures and speech difficulty. Psychiatric/Behavioral: Negative for hallucinations, self-injury and suicidal ideas. Vitals:    02/16/21 0642   BP: 126/86   Pulse: (!) 102   Resp: 17   Temp: 98.3 °F (36.8 °C)   SpO2: 96%            Physical Exam  HENT:      Head: Normocephalic and atraumatic. Eyes:      Pupils: Pupils are equal, round, and reactive to light. Neck:      Musculoskeletal: Normal range of motion and neck supple. Cardiovascular:      Rate and Rhythm: Normal rate. Heart sounds: No friction rub. No gallop. Pulmonary:      Effort: No respiratory distress. Abdominal:      General: Bowel sounds are normal.      Palpations: Abdomen is soft. Musculoskeletal: Normal range of motion. Skin:     General: Skin is warm and dry. Neurological:      Mental Status: She is alert and oriented to person, place, and time. Psychiatric:         Behavior: Behavior normal.         Thought Content: Thought content normal.         Judgment: Judgment normal.          MDM  Patient presents asking to quit using crack cocaine   Labs as interpreted by me mild hypokalemia  Potassium will be repleted  EKG ordered  EKG as interpreted by me 92, normal sinus rhythm, no ST changes,   Consulted crisis team  Patient not suicidal not homicidal  Patient given addiction resources    Pt has been reexamined. Patient has no new complaints, changes, or physical findings. Care plan outlined and precautions discussed. Results were reviewed with the patient. All medications were reviewed with the patient; will d/c home with PMD f/u. All of pt's questions and concerns were addressed. Patient was instructed and agrees to follow up with PMD, as well as to return to the ED upon further deterioration. Patient is ready to go home. This note was dictated utilizing voice recognition software which may lead to typographical errors. I apologize in advance if the situation occurs. If questions arise please do not hesitate to contact me or call our department.     Procedures

## 2021-02-16 NOTE — ED TRIAGE NOTES
Patient states that she has an addiction to crack and would like to stop. The last time she used was a couple hours ago.

## 2021-04-04 ENCOUNTER — HOSPITAL ENCOUNTER (EMERGENCY)
Age: 56
Discharge: HOME | End: 2021-04-04
Attending: EMERGENCY MEDICINE

## 2021-04-04 ENCOUNTER — APPOINTMENT (OUTPATIENT)
Dept: GENERAL RADIOLOGY | Age: 56
End: 2021-04-04
Attending: EMERGENCY MEDICINE

## 2021-04-04 VITALS
TEMPERATURE: 98.2 F | SYSTOLIC BLOOD PRESSURE: 109 MMHG | DIASTOLIC BLOOD PRESSURE: 64 MMHG | BODY MASS INDEX: 21.66 KG/M2 | RESPIRATION RATE: 20 BRPM | OXYGEN SATURATION: 99 % | WEIGHT: 130 LBS | HEIGHT: 65 IN | HEART RATE: 112 BPM

## 2021-04-04 DIAGNOSIS — S92.512A CLOSED DISPLACED FRACTURE OF PROXIMAL PHALANX OF LESSER TOE OF LEFT FOOT, INITIAL ENCOUNTER: Primary | ICD-10-CM

## 2021-04-04 PROCEDURE — 99283 EMERGENCY DEPT VISIT LOW MDM: CPT

## 2021-04-04 PROCEDURE — 73030 X-RAY EXAM OF SHOULDER: CPT

## 2021-04-04 PROCEDURE — 73630 X-RAY EXAM OF FOOT: CPT

## 2021-04-04 NOTE — ED TRIAGE NOTES
Patient c/o right shoulder pain x two weeks. She denies any known injury to right shoulder. C/o pain to toes of left foot. She states injury to toes after turning and striking them into the couch today.

## 2021-04-04 NOTE — ED PROVIDER NOTES
HPI   Patient with past medical history of asthma, cocaine abuse presents complaining of right shoulder pain for 2 weeks and left fourth and fifth toe pain after banging on her bed earlier today. Pain is sharp, constant, no radiation, no associated symptoms or modifying factors. Patient denies any shoulder injury. Denies any use of cocaine. Social history former smoker cocaine use    Past Medical History:   Diagnosis Date    Arthritis     Asthma     Bilateral leg pain     Carpal tunnel syndrome, right     symptoms    Cocaine dependence with other cocaine-induced disorder ([address]     DDD (degenerative disc disease), lumbar     mild    Eczema     Hypokalemia     Infectious disease     sphills     Lower back pain     Migraines     Myalgia     lumbar    Numbness and tingling in right hand     Pinched nerve     Sciatica     right lower extremity    Trichomonal vaginitis     UTI (urinary tract infection)        Past Surgical History:   Procedure Laterality Date    HX PARTIAL HYSTERECTOMY      HX TONSILLECTOMY      HX TUBAL LIGATION           Family History:   Problem Relation Age of Onset    Hypertension Other     Asthma Other     Hypertension Mother    [facility] Arthritis-osteo Mother        Social History     Socioeconomic History    Marital status:      Spouse name: Not on file    Number of children: Not on file    Years of education: Not on file    Highest education level: Not on file   Occupational History    Not on file   Social Needs    Financial resource strain: Not on file    Food insecurity     Worry: Not on file     Inability: Not on file    Transportation needs     Medical: Not on file     Non-medical: Not on file   Tobacco Use    Smoking status: Former Smoker     Quit date: 3/1/2012     Years since quittin.0    Smokeless tobacco: Never Used    Tobacco comment: less than a pack a day.   Current note from 15 states patient is a non-smoker   Substance and Sexual Activity    Alcohol use: No    Drug use: Not Currently     Types: Cocaine     Comment: last uses 2015    Sexual activity: Not on file   Lifestyle    Physical activity     Days per week: Not on file     Minutes per session: Not on file    Stress: Not on file   Relationships    Social connections     Talks on phone: Not on file     Gets together: Not on file     Attends Latter-day service: Not on file     Active member of club or organization: Not on file     Attends meetings of clubs or organizations: Not on file     Relationship status: Not on file    Intimate partner violence     Fear of current or ex partner: Not on file     Emotionally abused: Not on file     Physically abused: Not on file     Forced sexual activity: Not on file   Other Topics Concern    Not on file   Social History Narrative    Not on file         ALLERGIES: Egg, Ibuprofen, Darvocet a500 [propoxyphene n-acetaminophen], and Fish containing products    Review of Systems   Constitutional: Negative for activity change, appetite change and chills. HENT: Negative for congestion, ear discharge, ear pain and sore throat. Eyes: Negative for photophobia and pain. Respiratory: Negative for cough and choking. Cardiovascular: Negative for palpitations and leg swelling. Gastrointestinal: Negative for anal bleeding and rectal pain. Endocrine: Negative for polydipsia and polyuria. Genitourinary: Negative for genital sores and urgency. Musculoskeletal: Positive for myalgias. Negative for arthralgias. Neurological: Negative for dizziness, seizures and speech difficulty. Psychiatric/Behavioral: Negative for hallucinations, self-injury and suicidal ideas. Vitals:    04/04/21 1207   BP: 109/64   Pulse: (!) 112   Resp: 20   Temp: 98.2 °F (36.8 °C)   SpO2: 99%   Weight: 59 kg (130 lb)   Height: 5' 5\" (1.651 m)            Physical Exam  HENT:      Head: Normocephalic and atraumatic.    Eyes:      Pupils: Pupils are equal, round, and reactive to light. Neck:      Musculoskeletal: Normal range of motion and neck supple. Cardiovascular:      Rate and Rhythm: Normal rate. Heart sounds: No friction rub. No gallop. Pulmonary:      Effort: No respiratory distress. Abdominal:      General: Bowel sounds are normal.      Palpations: Abdomen is soft. Musculoskeletal: Normal range of motion. General: Tenderness present. Comments: Mild pain on abduction of right shoulder  No tenderness on exam  Full range of movement    Tenderness on palpation of fourth left toe  Full range of movement   Skin:     General: Skin is warm and dry. Neurological:      Mental Status: She is alert and oriented to person, place, and time. Psychiatric:         Behavior: Behavior normal.         Thought Content: Thought content normal.         Judgment: Judgment normal.          MDM  Number of Diagnoses or Management Options  Diagnosis management comments:  We will Order x-ray of right shoulder, left toes  Patient denies use of cocaine, however is dilated pupils, appears hyperactive, mildly tachycardic  Sympathomimetic symptoms  No shoulder fracture x-ray  Fracture proximal phalanx fourth toe    Claudio tape, orthopedic shoe and f/u with orthopedics    Procedures

## 2021-04-28 NOTE — PROGRESS NOTES
AMBULATORY PROGRESS NOTE      Patient: May Zhao             MRN: [id number]     SSN: [id number] Body mass index is 22.8 kg/m². YOB: 1965     AGE: 50 y.o. EX: female    PCP: None       IMPRESSION //  DIAGNOSIS AND TREATMENT PLAN        May Zhao has a diagnosis of:      DIAGNOSES  1. Closed nondisplaced fracture of proximal phalanx of lesser toe of left foot, initial encounter    2. Left foot pain        Orders Placed This Encounter    AMB POC XRAY, FOOT; COMPLETE, 3+ VIEW     ASK ALL FEMALE PATIENTS IF PREGNANT     Order Specific Question:   Reason for Exam     Answer:   PAIN    traMADoL (ULTRAM) 50 mg tablet     Sig: Take 1 Tab by mouth two (2) times a day for 7 days. Max Daily Amount: 100 mg. Dispense:  28 Tab     Refill:  0        PLAN:    1. Claudio tape left 2/3 toes        RTO-  4 weeks and PLEASE OBTAIN X-RAYS OF: left foot 3 VIEWS     May Zhao  expresses understanding of the diagnosis, treatment plan, and all of their proposed questions were answered to their satisfaction. Patient education has been provided re the diagnoses. May Zhao may have a reminder for a \"due or due soon\" health maintenance. I have asked that she contact her primary care provider for follow-up on this health maintenance. HPI //  [address] Santizo IS A 50 y.o. female who is a/an  new patient, presenting to my outpatient office for evaluation of  the following chief complaint(s):     Chief Complaint   Patient presents with    Toe Pain     left        May Zhao presents today with a history of a left #4 toe pain after she actually struck it on a piece of furniture about a week ago. She complains of pain and soreness to the left fourth toe. She arrives here today, wearing a hard soled shoe that is given to her by the emergency room when she had gone to see the ER staff.     She has x-rays in my office, 3 views of her left foot, nonweightbearing x-rays, standard AP, lateral, and oblique films: These films show essentially nondisplaced, nonangulated fracture to the left fourth toe distal end of the proximal phalanx. There are some mild soft tissue swelling to the left fourth toe. There are some mild OA to the left midfoot, the left #2 3 TMT joint relations. Advised I see no additional acute fractures, subluxations, dislocations, on these 3 views, left foot. Visit Vitals  Pulse (!) 102   Temp 97 °F (36.1 °C)   Resp 18   Ht 5' 5\" (1.651 m)   Wt 137 lb (62.1 kg)   SpO2 100%   BMI 22.80 kg/m²       Appearance: Alert, well appearing and pleasant patient who is in no distress, oriented to person, place/time, and who follows commands. This patient is accompanied in the examination room by her  self. There is signs of: no dementia  Psychiatric: Affect/mood are appropriate. Speech normal in context and clarity, memory intact grossly, no involuntary movements - tremors. Patient arrives to office via: with assistive device: hard shoe (ER given)  H EENT (2): Head normocephalic & atraumatic. Eye: pupils are round// EOM are intact // Neck: ROM WNL  // Hearings Intact   Respiratory: Breathing non labored     ANKLE/FOOT left    Gait: slow  Tenderness: moderate  Left 4th toe region   Cutaneous: mild swelling 4th toe region  Joint Motion:  Decreased to forefoot 3/4 toes at MTP/IP joints  Joint / Tendon Stability: No Ankle or Subtalar instability or joint laxity. No peroneal sublux ability or dislocation  Alignment: neutral Hindfoot,    Neuro Motor/Sensory: NL/NL  Vascular: NL foot/ankle pulses,   Lymphatics: No extremity lymphedema, No calf swelling, no tenderness to calf muscles. CHART REVIEW     Gisele Loco has been experiencing pain and discomfort confirmed as outlined in the pain assessment outlined below.  was reviewed by Kenneth Vasques MD on 4/29/2021.      Pain Assessment  4/29/2021   Location of Pain Toe Location Modifiers Left   Severity of Pain 8   Quality of Pain Sharp   Duration of Pain Persistent   Frequency of Pain Constant   Date Pain First Started (No Data)   Date Pain First Started Comment 3 weeks ago   Aggravating Factors Walking;Standing   Aggravating Factors Comment -   Limiting Behavior Yes   Relieving Factors Nothing   Result of Injury Yes   Type of Injury Other (Comment)   Type of Injury Comment kicked the couch. Carlos Enrique Clark  has a past medical history of Arthritis, Asthma, Bilateral leg pain, Carpal tunnel syndrome, right, Cocaine dependence with other cocaine-induced disorder ([facility] [address] DDD (degenerative disc disease), lumbar, Eczema, Hypokalemia, Infectious disease, Lower back pain, Migraines, Myalgia, Numbness and tingling in right hand, Pinched nerve, Sciatica, Trichomonal vaginitis, and UTI (urinary tract infection). Patients is employed at:         Past Medical History:   Diagnosis Date    Arthritis     Asthma     Bilateral leg pain     Carpal tunnel syndrome, right     symptoms    Cocaine dependence with other cocaine-induced disorder (HCC)     DDD (degenerative disc disease), lumbar     mild    Eczema     Hypokalemia     Infectious disease     sphills     Lower back pain     Migraines     Myalgia     lumbar    Numbness and tingling in right hand     Pinched nerve     Sciatica     right lower extremity    Trichomonal vaginitis     UTI (urinary tract infection)      Past Surgical History:   Procedure Laterality Date    HX PARTIAL HYSTERECTOMY      HX TONSILLECTOMY      HX TUBAL LIGATION       Current Outpatient Medications   Medication Sig    traMADoL (ULTRAM) 50 mg tablet Take 1 Tab by mouth two (2) times a day for 7 days. Max Daily Amount: 100 mg. No current facility-administered medications for this visit.       Allergies   Allergen Reactions    Egg Anaphylaxis    Ibuprofen Hives    Darvocet A500 [Propoxyphene N-Acetaminophen] Hives    Fish Containing Products Hives     Social History     Occupational History    Not on file   Tobacco Use    Smoking status: Former Smoker     Quit date: 3/1/2012     Years since quittin.1    Smokeless tobacco: Never Used    Tobacco comment: less than a pack a day. Current note from 4-1-15 states patient is a non-smoker   Substance and Sexual Activity    Alcohol use: No    Drug use: Not Currently     Types: Cocaine     Comment: last uses 1    Sexual activity: Not on file     Family History   Problem Relation Age of Onset    Hypertension Other     Asthma Other     Hypertension Mother    [facility] Arthritis-osteo Mother         DIAGNOSTIC LAB DATA      No results found for: HBA1C, HGBE8, GQL4XJZA, NQS6PMXX //   Lab Results   Component Value Date/Time    Glucose 81 2021 06:54 AM        No results found for: DMM8PCRO, HTG2QDKT      No results found for: VITD3, XQVID2, XQVID3, XQVID, VD3RIA, KUQX59FUEFS      REVIEW OF SYSTEMS : 2021  ALL BELOW ARE Negative except : SEE HPI     All other systems reviewed and are negative. 12 point review of systems otherwise negative unless noted in HPI. DIAGNOSTIC IMAGING /ORDERS     X RAYS/IMAGES DONE AT [address] [place] [address] 2021      She has x-rays in my office, 3 views of her left foot, nonweightbearing x-rays, standard AP, lateral, and oblique films: These films show essentially nondisplaced, nonangulated fracture to the left fourth toe distal end of the proximal phalanx. There are some mild soft tissue swelling to the left fourth toe. There are some mild OA to the left midfoot, the left #2 3 TMT joint relations.   Advised I see no additional acute fractures, subluxations, dislocations, on these 3 views, left foot            [facility]: INTERPRETATION BY RADIOLOGIST     XR Results (most recent):  Results from Hospital Encounter encounter on 21   XR FOOT LT MIN 3 V    Narrative Foot complete    Three views of the left foot are reviewed. INDICATION: Pain fourth and fifth toes following trauma. COMPARISON: May 2015. FINDINGS:    There is a partially impacted articular medial corner fracture of the distal end  of the proximal phalanx of the fourth toe . No osteolytic or osteoblastic lesions are evident. No significant degenerative changes noted. Bone mineralization seems adequate. Impression Fracture proximal phalanx fourth toe. Result Information    Status: Final result (Exam End: 4/4/2021 12:44) Provider Status: Open   Study Result    Shoulder complete     Three  views of the right shoulder  are reviewed.     INDICATION: Pain without injury.     COMPARISON: Chest radiograph 2018.     FINDINGS:     There is mild degenerative change in the acromioclavicular joint. .  The shoulder is otherwise unremarkable in appearance. There are fractures, probably healed, in the right fourth fifth and sixth  lateral ribs. Bone mineralization seems unremarkable.        IMPRESSION     Negative shoulder     Result Information    Status: Final result (Exam End: 4/4/2021 12:44) Provider Status: Open   Study Result    Shoulder complete     Three  views of the right shoulder  are reviewed.     INDICATION: Pain without injury.     COMPARISON: Chest radiograph 2018.     FINDINGS:     There is mild degenerative change in the acromioclavicular joint. .  The shoulder is otherwise unremarkable in appearance. There are fractures, probably healed, in the right fourth fifth and sixth  lateral ribs. Bone mineralization seems unremarkable.        IMPRESSION     Negative shoulder          I have reviewed the radiology transcribed results/ and I have reviewed the images of the above study. An electronic signature was used to authenticate this note.     Анна Acosta MD  4/29/2021  8:19 PM      Disclaimer: Sections of this note are dictated using utilizing voice recognition software, which may have resulted in some phonetic based errors in grammar and contents. Even though attempts were made to correct all the mistakes, some may have been missed, and remained in the body of the document. If questions arise, please contact our department.      Perry Simon MD  4/29/2021  8:19 PM

## 2021-04-29 ENCOUNTER — OFFICE VISIT (OUTPATIENT)
Dept: ORTHOPEDIC SURGERY | Age: 56
End: 2021-04-29

## 2021-04-29 VITALS
OXYGEN SATURATION: 100 % | WEIGHT: 137 LBS | BODY MASS INDEX: 22.82 KG/M2 | RESPIRATION RATE: 18 BRPM | HEART RATE: 102 BPM | HEIGHT: 65 IN | TEMPERATURE: 97 F

## 2021-04-29 DIAGNOSIS — M79.672 LEFT FOOT PAIN: ICD-10-CM

## 2021-04-29 DIAGNOSIS — S92.515A CLOSED NONDISPLACED FRACTURE OF PROXIMAL PHALANX OF LESSER TOE OF LEFT FOOT, INITIAL ENCOUNTER: Primary | ICD-10-CM

## 2021-04-29 PROCEDURE — 73630 X-RAY EXAM OF FOOT: CPT | Performed by: ORTHOPAEDIC SURGERY

## 2021-04-29 PROCEDURE — 99203 OFFICE O/P NEW LOW 30 MIN: CPT | Performed by: ORTHOPAEDIC SURGERY

## 2021-04-29 RX ORDER — TRAMADOL HYDROCHLORIDE 50 MG/1
50 TABLET ORAL 2 TIMES DAILY
Qty: 28 TAB | Refills: 0 | Status: SHIPPED | OUTPATIENT
Start: 2021-04-29 | End: 2021-05-06

## 2021-05-03 ENCOUNTER — TELEPHONE (OUTPATIENT)
Dept: ORTHOPEDIC SURGERY | Age: 56
End: 2021-05-03

## 2021-05-03 DIAGNOSIS — S92.515A CLOSED NONDISPLACED FRACTURE OF PROXIMAL PHALANX OF LESSER TOE OF LEFT FOOT, INITIAL ENCOUNTER: Primary | ICD-10-CM

## 2021-05-03 RX ORDER — HYDROCODONE BITARTRATE AND ACETAMINOPHEN 5; 325 MG/1; MG/1
1 TABLET ORAL
Qty: 14 TAB | Refills: 0 | Status: SHIPPED | OUTPATIENT
Start: 2021-05-03 | End: 2021-05-10

## 2021-05-03 NOTE — TELEPHONE ENCOUNTER
The following prescriptions have been e-prescribed to the patients [facility] pharmacy located [facility]:    Orders Placed This Encounter    HYDROcodone-acetaminophen (NORCO) 5-325 mg per tablet     Sig: Take 1 Tab by mouth two (2) times daily as needed for Pain for up to 7 days. Max Daily Amount: 2 Tabs. Dispense:  14 Tab     Refill:  0           Fariha Zee [facility], [place], PA-C  5/3/2021  4:06 PM

## 2021-05-03 NOTE — TELEPHONE ENCOUNTER
Patient called stating her rx traMADoL (ULTRAM) 50 mg tablet is making her sick to her stomach, and is instead requesting an rx for Vicodin. She stated vicodin has helped her in the past for her pain. She wants it to be sent to [facility] on [address].    Patient can be reached at [phone].

## 2021-05-10 DIAGNOSIS — S92.515A CLOSED NONDISPLACED FRACTURE OF PROXIMAL PHALANX OF LESSER TOE OF LEFT FOOT, INITIAL ENCOUNTER: ICD-10-CM

## 2021-05-10 RX ORDER — HYDROCODONE BITARTRATE AND ACETAMINOPHEN 5; 325 MG/1; MG/1
1 TABLET ORAL
Qty: 14 TAB | Refills: 0 | OUTPATIENT
Start: 2021-05-10 | End: 2021-05-17

## 2021-05-10 NOTE — TELEPHONE ENCOUNTER
Patient's request for medication refill has been denied. Patient can use OTC pain medicine at this time. Fariha Elaine [facility], MPA, PA-C  5/10/2021   4:00 PM

## 2021-05-10 NOTE — TELEPHONE ENCOUNTER
[facility] date for Norco as 5/3/21 for a 7 d/s. Last Visit: 4/29/21 with MD Adames  Next Appointment: Jessica Daly to follow-up in 4 weeks  Previous Refill Encounter(s): 5/3/21 #14    Requested Prescriptions     Pending Prescriptions Disp Refills    HYDROcodone-acetaminophen (NORCO) 5-325 mg per tablet 14 Tab 0     Sig: Take 1 Tab by mouth two (2) times daily as needed for Pain for up to 7 days. Max Daily Amount: 2 Tabs.

## 2022-01-04 ENCOUNTER — HOSPITAL ENCOUNTER (EMERGENCY)
Age: 57
Discharge: HOME | End: 2022-01-04
Attending: EMERGENCY MEDICINE

## 2022-01-04 VITALS
SYSTOLIC BLOOD PRESSURE: 113 MMHG | DIASTOLIC BLOOD PRESSURE: 71 MMHG | HEART RATE: 112 BPM | HEIGHT: 65 IN | WEIGHT: 130 LBS | BODY MASS INDEX: 21.66 KG/M2 | RESPIRATION RATE: 20 BRPM | OXYGEN SATURATION: 98 % | TEMPERATURE: 98.4 F

## 2022-01-04 DIAGNOSIS — L50.9 URTICARIA: Primary | ICD-10-CM

## 2022-01-04 DIAGNOSIS — H10.13 ALLERGIC CONJUNCTIVITIS OF BOTH EYES: ICD-10-CM

## 2022-01-04 DIAGNOSIS — T78.40XA ALLERGIC REACTION, INITIAL ENCOUNTER: ICD-10-CM

## 2022-01-04 PROCEDURE — 99282 EMERGENCY DEPT VISIT SF MDM: CPT

## 2022-01-04 RX ORDER — PREDNISONE 20 MG/1
60 TABLET ORAL DAILY
Qty: 15 TABLET | Refills: 0 | Status: SHIPPED | OUTPATIENT
Start: 2022-01-04 | End: 2022-01-09

## 2022-01-04 RX ORDER — KETOTIFEN FUMARATE 0.35 MG/ML
1 SOLUTION/ DROPS OPHTHALMIC 2 TIMES DAILY
Qty: 1 EACH | Refills: 0 | Status: SHIPPED | OUTPATIENT
Start: 2022-01-04 | End: 2022-01-14

## 2022-01-04 RX ORDER — PRAMOXINE HYDROCHLORIDE AND ZINC ACETATE LOTION 10; 1 MG/ML; MG/ML
LOTION TOPICAL
Qty: 1 EACH | Refills: 0 | Status: SHIPPED | OUTPATIENT
Start: 2022-01-04 | End: 2022-01-04 | Stop reason: SDUPTHER

## 2022-01-04 RX ORDER — PRAMOXINE HYDROCHLORIDE AND ZINC ACETATE LOTION 10; 1 MG/ML; MG/ML
LOTION TOPICAL
Qty: 1 EACH | Refills: 0 | Status: SHIPPED | OUTPATIENT
Start: 2022-01-04 | End: 2022-05-12

## 2022-01-04 RX ORDER — FAMOTIDINE 20 MG/1
20 TABLET, FILM COATED ORAL 2 TIMES DAILY
Qty: 10 TABLET | Refills: 0 | Status: SHIPPED | OUTPATIENT
Start: 2022-01-04 | End: 2022-01-09

## 2022-01-04 RX ORDER — DIPHENHYDRAMINE HCL 25 MG
CAPSULE ORAL
Qty: 16 CAPSULE | Refills: 0 | Status: SHIPPED | OUTPATIENT
Start: 2022-01-04 | End: 2022-05-12

## 2022-01-04 NOTE — ED PROVIDER NOTES
EMERGENCY DEPARTMENT HISTORY AND PHYSICAL EXAM    10:12 AM  Date: 2022  Patient Name: Armen Bill    History of Presenting Illness       History Provided By:     HPI: Armen Bill is a 49 y.o. female with medical history of asthma, eczema, food allergies presents with itching of her face, arms, hives, running eyes. Patient states swelling and hives are mostly concentrated on her face and arms. Her skin feels very itchy. Denies any vision changes. Denies any shortness of breath. Cannot think of any inducing factors for her allergies but using new air freshener lately. Patient has not used any cocaine or drugs for a year. PCP: None    Past History     Past Medical History:  Past Medical History:   Diagnosis Date    Arthritis     Asthma     Bilateral leg pain     Carpal tunnel syndrome, right     symptoms    Cocaine dependence with other cocaine-induced disorder (HCC)     DDD (degenerative disc disease), lumbar     mild    Eczema     Hypokalemia     Infectious disease     sphills     Lower back pain     Migraines     Myalgia     lumbar    Numbness and tingling in right hand     Pinched nerve     Sciatica     right lower extremity    Trichomonal vaginitis     UTI (urinary tract infection)        Past Surgical History:  Past Surgical History:   Procedure Laterality Date    HX PARTIAL HYSTERECTOMY      HX TONSILLECTOMY      HX TUBAL LIGATION         Family History:  Family History   Problem Relation Age of Onset    Hypertension Other     Asthma Other     Hypertension Mother    [facility] OSTEOARTHRITIS Mother        Social History:  Social History     Tobacco Use    Smoking status: Current Every Day Smoker     Last attempt to quit: 3/1/2012     Years since quittin.8    Smokeless tobacco: Never Used   Substance Use Topics    Alcohol use: No    Drug use: Not Currently     Types: Cocaine     Comment: last uses        Allergies:   Allergies   Allergen Reactions    Egg Anaphylaxis  Ibuprofen Hives    Darvocet A500 [Propoxyphene N-Acetaminophen] Hives    Fish Containing Products Hives       Review of Systems   Review of Systems   Constitutional: Negative for activity change, appetite change and chills. HENT: Negative for congestion, ear discharge, ear pain and sore throat. Eyes: Negative for photophobia and pain. Respiratory: Negative for cough and choking. Cardiovascular: Negative for palpitations and leg swelling. Gastrointestinal: Negative for anal bleeding and rectal pain. Endocrine: Negative for polydipsia and polyuria. Genitourinary: Negative for genital sores and urgency. Musculoskeletal: Negative for arthralgias and myalgias. Skin: Positive for rash. Neurological: Negative for dizziness, seizures and speech difficulty. Psychiatric/Behavioral: Negative for hallucinations, self-injury and suicidal ideas. Physical Exam     Patient Vitals for the past 12 hrs:   Temp Pulse Resp BP SpO2   01/04/22 1000 98.4 °F (36.9 °C) (!) 112 20 113/71 98 %       Physical Exam  Vitals and nursing note reviewed. Constitutional:       Appearance: She is well-developed. HENT:      Head: Normocephalic and atraumatic. Eyes:      General:         Right eye: Discharge present. Left eye: Discharge present. Comments: Clear bilateral discharge   Cardiovascular:      Rate and Rhythm: Normal rate and regular rhythm. Heart sounds: Normal heart sounds. No murmur heard. Pulmonary:      Effort: Pulmonary effort is normal. No respiratory distress. Breath sounds: Normal breath sounds. No stridor. No wheezing or rales. Chest:      Chest wall: No tenderness. Abdominal:      General: Bowel sounds are normal. There is no distension. Palpations: Abdomen is soft. Tenderness: There is no abdominal tenderness. There is no guarding or rebound. Musculoskeletal:         General: Normal range of motion.       Cervical back: Normal range of motion and neck supple. Skin:     General: Skin is warm and dry. Findings: Rash present. Comments: Facial and bilateral upper extremity flexural surface urticaria, erythema   Neurological:      Mental Status: She is alert and oriented to person, place, and time. Diagnostic Study Results     Labs -  No results found for this or any previous visit (from the past 12 hour(s)). Radiologic Studies -   No results found. Medical Decision Making     ED Course: Progress Notes, Reevaluation, and Consults:    10:12 AM Initial assessment performed. The patients presenting problems have been discussed, and they/their family are in agreement with the care plan formulated and outlined with them. I have encouraged them to ask questions as they arise throughout their visit. Provider Notes (Medical Decision Making):   Patient presents with multiple allergies presents with urticaria    Vitals within normal limits apart from mild tachycardia  Oropharynx patent, no wheeze, lungs clear  Patient will be given steroids, antihistamines  Patient advised follow-up with PMD and allergist  Strict return precautions given        Vital Signs-Reviewed the patient's vital signs. Reviewed pt's pulse ox reading. Records Reviewed: old medical records  -I am the first provider for this patient.  -I reviewed the vital signs, available nursing notes, past medical history, past surgical history, family history and social history. Clinical Impression     Clinical Impression: No diagnosis found. Disposition: This note was dictated utilizing voice recognition software which may lead to typographical errors. I apologize in advance if the situation occurs. If questions arise please do not hesitate to contact me or call our department.     Vanessa Gary MD  10:12 AM

## 2022-01-04 NOTE — ED TRIAGE NOTES
Patient states hx of eczema. She states unknown irritant has caused redness, itching, and swelling to eyes and face. She states skin irritation x 3 days.

## 2022-01-04 NOTE — DISCHARGE INSTRUCTIONS
Please follow-up with PMD.  Please return if symptoms worsen her shortness of breath or any other concerns

## 2022-05-12 ENCOUNTER — APPOINTMENT (OUTPATIENT)
Dept: GENERAL RADIOLOGY | Age: 57
End: 2022-05-12
Attending: EMERGENCY MEDICINE

## 2022-05-12 ENCOUNTER — HOSPITAL ENCOUNTER (EMERGENCY)
Age: 57
Discharge: HOME | End: 2022-05-12
Attending: EMERGENCY MEDICINE

## 2022-05-12 VITALS
WEIGHT: 174 LBS | TEMPERATURE: 98.6 F | HEIGHT: 65 IN | RESPIRATION RATE: 18 BRPM | HEART RATE: 111 BPM | OXYGEN SATURATION: 97 % | BODY MASS INDEX: 28.99 KG/M2 | SYSTOLIC BLOOD PRESSURE: 104 MMHG | DIASTOLIC BLOOD PRESSURE: 77 MMHG

## 2022-05-12 DIAGNOSIS — M87.051 AVASCULAR NECROSIS OF RIGHT FEMORAL HEAD (HCC): Primary | ICD-10-CM

## 2022-05-12 PROCEDURE — 99283 EMERGENCY DEPT VISIT LOW MDM: CPT

## 2022-05-12 PROCEDURE — 74011250637 HC RX REV CODE- 250/637: Performed by: EMERGENCY MEDICINE

## 2022-05-12 PROCEDURE — 73502 X-RAY EXAM HIP UNI 2-3 VIEWS: CPT

## 2022-05-12 RX ORDER — HYDROCODONE BITARTRATE AND ACETAMINOPHEN 5; 325 MG/1; MG/1
1 TABLET ORAL
Qty: 10 TABLET | Refills: 0 | Status: SHIPPED | OUTPATIENT
Start: 2022-05-12 | End: 2022-05-15

## 2022-05-12 RX ORDER — ALBUTEROL SULFATE 90 UG/1
AEROSOL, METERED RESPIRATORY (INHALATION)
COMMUNITY

## 2022-05-12 RX ORDER — HYDROCODONE BITARTRATE AND ACETAMINOPHEN 5; 325 MG/1; MG/1
1 TABLET ORAL
Status: COMPLETED | OUTPATIENT
Start: 2022-05-12 | End: 2022-05-12

## 2022-05-12 RX ADMIN — HYDROCODONE BITARTRATE AND ACETAMINOPHEN 1 TABLET: 5; 325 TABLET ORAL at 13:01

## 2022-05-12 NOTE — ED PROVIDER NOTES
EMERGENCY DEPARTMENT HISTORY AND PHYSICAL EXAM    12:28 PM  Date: 5/12/2022  Patient Name: Damian Covington    History of Presenting Illness       History Provided By:     HPI: Damian Covington is a 49 y.o. female with history as below presents with right hip pain for a month. Patient denies any injury. Pain is worse when she is walking. No fever. PCP: None    Past History     Past Medical History:  Past Medical History:   Diagnosis Date    Arthritis     Asthma     Bilateral leg pain     Carpal tunnel syndrome, right     symptoms    Cellulitis of face     Cocaine dependence with other cocaine-induced disorder (HCC)     DDD (degenerative disc disease), lumbar     mild    Eczema     Hypokalemia     Infectious disease     sphills     Lower back pain     Migraines     Myalgia     lumbar    Numbness and tingling in right hand     Pinched nerve     Sciatica     right lower extremity    Trichomonal vaginitis     Urticaria     UTI (urinary tract infection)     Vasovagal syncope        Past Surgical History:  Past Surgical History:   Procedure Laterality Date    HX PARTIAL HYSTERECTOMY      HX TONSILLECTOMY      HX TUBAL LIGATION         Family History:  Family History   Problem Relation Age of Onset    Hypertension Other     Asthma Other     Hypertension Mother    [facility] OSTEOARTHRITIS Mother        Social History:  Social History     Tobacco Use    Smoking status: Current Every Day Smoker     Last attempt to quit: 3/1/2012     Years since quitting: 10.2    Smokeless tobacco: Never Used   Substance Use Topics    Alcohol use: No    Drug use: Not Currently     Types: Cocaine     Comment: last uses 2015       Allergies: Allergies   Allergen Reactions    Egg Anaphylaxis    Ibuprofen Hives    Darvocet A500 [Propoxyphene N-Acetaminophen] Hives    Fish Containing Products Hives       Review of Systems   Review of Systems   Constitutional: Negative for activity change, appetite change and chills. HENT: Negative for congestion, ear discharge, ear pain and sore throat. Eyes: Negative for photophobia and pain. Respiratory: Negative for cough and choking. Cardiovascular: Negative for palpitations and leg swelling. Gastrointestinal: Negative for anal bleeding and rectal pain. Endocrine: Negative for polydipsia and polyuria. Genitourinary: Negative for genital sores and urgency. Musculoskeletal: Negative for arthralgias and myalgias. Neurological: Negative for dizziness, seizures and speech difficulty. Psychiatric/Behavioral: Negative for hallucinations, self-injury and suicidal ideas. Physical Exam     No data found. Physical Exam  Vitals and nursing note reviewed. Constitutional:       Appearance: She is well-developed. HENT:      Head: Normocephalic and atraumatic. Eyes:      General:         Right eye: No discharge. Left eye: No discharge. Cardiovascular:      Rate and Rhythm: Normal rate and regular rhythm. Heart sounds: Normal heart sounds. No murmur heard. Pulmonary:      Effort: Pulmonary effort is normal. No respiratory distress. Breath sounds: Normal breath sounds. No stridor. No wheezing or rales. Chest:      Chest wall: No tenderness. Abdominal:      General: Bowel sounds are normal. There is no distension. Palpations: Abdomen is soft. Tenderness: There is no abdominal tenderness. There is no guarding or rebound. Musculoskeletal:      Cervical back: Normal range of motion and neck supple. Comments: ROM limited by pain   R lower extremity neurovascular intact   Tenderness on palpation of R hip  No deformity   Skin:     General: Skin is warm and dry. Neurological:      Mental Status: She is alert and oriented to person, place, and time. Diagnostic Study Results     Labs -  No results found for this or any previous visit (from the past 12 hour(s)). Radiologic Studies -   No results found.       Medical Decision Making     ED Course: Progress Notes, Reevaluation, and Consults:    12:28 PM Initial assessment performed. The patients presenting problems have been discussed, and they/their family are in agreement with the care plan formulated and outlined with them. I have encouraged them to ask questions as they arise throughout their visit. Provider Notes (Medical Decision Making):   Patient presents with right hip pain for a month  No injury  Xray  Right femoral head avascular necrosis with evidence of articular surface collapse is new compared to prior MRI. Discussed with Dr. Matthew Renee orthopedics who recommended no weightbearing, crutches and orthopedics follow up. Vital Signs-Reviewed the patient's vital signs. Reviewed pt's pulse ox reading. Records Reviewed: old medical records  -I am the first provider for this patient.  -I reviewed the vital signs, available nursing notes, past medical history, past surgical history, family history and social history. Current Outpatient Medications   Medication Sig Dispense Refill    albuterol (PROVENTIL HFA, VENTOLIN HFA, PROAIR HFA) 90 mcg/actuation inhaler Take  by inhalation.  HYDROcodone-acetaminophen (Norco) 5-325 mg per tablet Take 1 Tablet by mouth every six (6) hours as needed for Pain for up to 3 days. Max Daily Amount: 4 Tablets. 10 Tablet 0        Clinical Impression     Clinical Impression:   1. Avascular necrosis of right femoral head (HCC)        Disposition: This note was dictated utilizing voice recognition software which may lead to typographical errors. I apologize in advance if the situation occurs. If questions arise please do not hesitate to contact me or call our department.     Megan Madera MD  12:28 PM

## 2022-05-12 NOTE — ED TRIAGE NOTES
Patient c/o right pain x one month. She denies any known fall or injury to hip. She states pain with ambulation.

## 2022-05-12 NOTE — Clinical Note
[address] [place] [address] [facility]  [id number] [facility] [id number] [phone]    Work/School Note    Date: 5/12/2022    To Whom It May concern:    Teja Delacruz was seen and treated today in the emergency room by the following provider(s):  Attending Provider: Hilda Andersen MD.      Teja Delacruz is excused from work/school on 5/12/2022 through 5/14/2022. She is medically clear to return to work/school on 5/15/2022.          Sincerely,          Isauro Huntley MD

## 2022-05-19 ENCOUNTER — OFFICE VISIT (OUTPATIENT)
Dept: ORTHOPEDIC SURGERY | Age: 57
End: 2022-05-19

## 2022-05-19 VITALS
WEIGHT: 174 LBS | HEIGHT: 65 IN | HEART RATE: 124 BPM | OXYGEN SATURATION: 97 % | BODY MASS INDEX: 28.99 KG/M2 | TEMPERATURE: 97.3 F

## 2022-05-19 DIAGNOSIS — G89.29 CHRONIC PAIN OF RIGHT HIP: ICD-10-CM

## 2022-05-19 DIAGNOSIS — M87.051 AVASCULAR NECROSIS OF BONE OF RIGHT HIP (HCC): Primary | ICD-10-CM

## 2022-05-19 DIAGNOSIS — M16.11 PRIMARY OSTEOARTHRITIS OF RIGHT HIP: ICD-10-CM

## 2022-05-19 DIAGNOSIS — M25.551 CHRONIC PAIN OF RIGHT HIP: ICD-10-CM

## 2022-05-19 PROCEDURE — 99213 OFFICE O/P EST LOW 20 MIN: CPT | Performed by: SPECIALIST

## 2022-05-19 PROCEDURE — 20610 DRAIN/INJ JOINT/BURSA W/O US: CPT | Performed by: SPECIALIST

## 2022-05-19 RX ORDER — BETAMETHASONE SODIUM PHOSPHATE AND BETAMETHASONE ACETATE 3; 3 MG/ML; MG/ML
3 INJECTION, SUSPENSION INTRA-ARTICULAR; INTRALESIONAL; INTRAMUSCULAR; SOFT TISSUE ONCE
Status: COMPLETED | OUTPATIENT
Start: 2022-05-19 | End: 2022-05-19

## 2022-05-19 RX ADMIN — BETAMETHASONE SODIUM PHOSPHATE AND BETAMETHASONE ACETATE 3 MG: 3; 3 INJECTION, SUSPENSION INTRA-ARTICULAR; INTRALESIONAL; INTRAMUSCULAR; SOFT TISSUE at 15:13

## 2022-05-19 NOTE — PROGRESS NOTES
Patient: Crista Gaona                MRN: [id number]       SSN: [id number]  YOB: 1965        AGE: 49 y.o. SEX: female    PCP: None  05/19/22    Chief Complaint   Patient presents with    Hip Pain     rt     HISTORY:  Crista Gaona is a 49 y.o. female who is seen for is seen for right hip pain. She does not recall any injury. She has been experiencing right groin pain for the past several months. She feels increased pain with standing and walking. Her hip stiffens up after she sits for long periods of time. Recent x-rays revealed AVN. She was previously seen by Dr. Cristofer Glasgow. Pain Assessment  5/19/2022   Location of Pain Hip   Location Modifiers Right   Severity of Pain 10   Quality of Pain Sharp   Duration of Pain Persistent   Frequency of Pain Constant   Date Pain First Started -   Date Pain First Started Comment -   Aggravating Factors Walking;Standing;Squatting   Aggravating Factors Comment -   Limiting Behavior Yes   Relieving Factors NSAID;Nothing   Result of Injury No   Type of Injury -   Type of Injury Comment -     Occupation, etc:  Ms. Shakira England has not been employed for the past 10 years. She used to work as a CNA at [employer]. She lives with her  in [place]. She has 2 daughters, 1 son, 2 grandsons, and 2 granddaughters. Ms. Shakira England weighs 174 lbs and is 5'5\" tall. She has no history of diabetes or hypertension. No results found for: HBA1C, UID5OKYE, BDT4RTUO, YPF7WYAK  Weight Metrics 5/19/2022 5/12/2022 1/4/2022 4/29/2021 4/4/2021 11/13/2019 4/1/2018   Weight 174 lb 174 lb 130 lb 137 lb 130 lb 187 lb 200 lb   BMI 28.96 kg/m2 28.96 kg/m2 21.63 kg/m2 22.8 kg/m2 21.63 kg/m2 31.12 kg/m2 33.28 kg/m2       Patient Active Problem List   Diagnosis Code    Asthma with exacerbation J45. 901     REVIEW OF SYSTEMS:    Constitutional Symptoms: Negative   Eyes: Negative   Ears, Nose, Throat and Mouth: Negative   Cardiovascular: Negative   Respiratory: Negative   Genitourinary: Per HPI   Gastrointestinal: Per HPI   Integumentary (Skin and/or Breast): Negative   Musculoskeletal: Per HPI   Endocrine/Rheumatologic: Negative   Neurological: Per HPI   Hematology/Lymphatic: Negative    Allergic/Immunologic: Negative   Phychiatric: Negative    Social History     Socioeconomic History    Marital status:      Spouse name: Not on file    Number of children: Not on file    Years of education: Not on file    Highest education level: Not on file   Occupational History    Not on file   Tobacco Use    Smoking status: Current Every Day Smoker     Last attempt to quit: 3/1/2012     Years since quitting: 10.2    Smokeless tobacco: Never Used   Substance and Sexual Activity    Alcohol use: No    Drug use: Not Currently     Types: Cocaine     Comment: last uses 2015    Sexual activity: Not on file   Other Topics Concern    Not on file   Social History Narrative    Not on file     Social Determinants of Health     Financial Resource Strain:     Difficulty of Paying Living Expenses: Not on file   Food Insecurity:     Worried About [address] in the Last Year: Not on file    Horacio of Food in the Last Year: Not on file   Transportation Needs:     Lack of Transportation (Medical): Not on file    Lack of Transportation (Non-Medical):  Not on file   Physical Activity:     Days of Exercise per Week: Not on file    Minutes of Exercise per Session: Not on file   Stress:     Feeling of Stress : Not on file   Social Connections:     Frequency of Communication with Friends and Family: Not on file    Frequency of Social Gatherings with Friends and Family: Not on file    Attends Yarsani Services: Not on file    Active Member of Clubs or Organizations: Not on file    Attends Club or Organization Meetings: Not on file    Marital Status: Not on file   Intimate Partner Violence:     Fear of Current or Ex-Partner: Not on file    Emotionally Abused: Not on file    Physically Abused: Not on file    Sexually Abused: Not on file   Housing Stability:     Unable to Pay for Housing in the Last Year: Not on file    Number of Places Lived in the Last Year: Not on file    Unstable Housing in the Last Year: Not on file      Allergies   Allergen Reactions    Egg Anaphylaxis    Ibuprofen Hives    Darvocet A500 [Propoxyphene N-Acetaminophen] Hives    Fish Containing Products Hives      Current Outpatient Medications   Medication Sig    albuterol (PROVENTIL HFA, VENTOLIN HFA, PROAIR HFA) 90 mcg/actuation inhaler Take  by inhalation. No current facility-administered medications for this visit. PHYSICAL EXAMINATION:  Visit Vitals  Pulse (!) 124   Temp 97.3 °F (36.3 °C) (Temporal)   Ht 5' 5\" (1.651 m)   Wt 174 lb (78.9 kg)   SpO2 97%   BMI 28.96 kg/m²      ORTHO EXAMINATION:  Examination Right hip Left hip   Skin Intact Intact   External Rotation ROM 10 10   Internal Rotation ROM 5 pain 10   Trochanteric tenderness - -   Hip flexion contracture - -   Antalgic gait - -   Trendelenberg sign - -   Lumbar tenderness - -   Straight leg raise - -   Calf tenderness - -   Neurovascular Intact Intact    ambulating with one crutch    TIME OUT:  Chart reviewed for the following:   I, Montse Noland MD, have reviewed the History, Physical and updated the Allergic reactions for [address] performed immediately prior to start of procedure:  Chilango Garcia MD, have performed the following reviews on [facility] prior to the start of the procedure:          * Patient was identified by name and date of birth   * Agreement on procedure being performed was verified  * Risks and Benefits explained to the patient  * Procedure site verified and marked as necessary  * Patient was positioned for comfort  * Consent was obtained     Time: 3:03 PM     Date of procedure: 5/19/2022  Procedure performed by:  Montse Noland MD  Ms. Jimenez tolerated the procedure well with no complications. RADIOGRAPHS:  XR RIGHT HIP 5/12/22 [facility] ED  IMPRESSION  Right femoral head avascular necrosis with evidence of articular surface  collapse is new compared to prior MRI. MRI could be considered to further characterize extent and acuity of disease. IMPRESSION:      ICD-10-CM ICD-9-CM    1. Avascular necrosis of bone of right hip (HCC)  M87.051 733.42 betamethasone (CELESTONE) injection 3 mg      DRAIN/INJECT LARGE JOINT/BURSA   2. Primary osteoarthritis of right hip  M16.11 715.15 betamethasone (CELESTONE) injection 3 mg      DRAIN/INJECT LARGE JOINT/BURSA      AMB SUPPLY ORDER   3. Chronic pain of right hip  M25.551 719.45 betamethasone (CELESTONE) injection 3 mg    G89.29 338.29 DRAIN/INJECT LARGE JOINT/BURSA      AMB SUPPLY ORDER     PLAN:  Patient was provided a single point cane. After discussing treatment options, patient's right lateral hip was injected with 4 cc Marcaine and 1/2 cc Celestone. There is no need for surgery at this time. She will follow up as needed.       Scribed by Radha Harris ([address]) as dictated by Jacinto Cote MD

## 2022-05-20 ENCOUNTER — TELEPHONE (OUTPATIENT)
Dept: ORTHOPEDIC SURGERY | Age: 57
End: 2022-05-20

## 2022-05-20 NOTE — TELEPHONE ENCOUNTER
Patient called and said she saw Dasha Jimenez on 5/19/22 for the Right Hip. Patient said that  gave her an injection. Patient said that the injection is not working. That she is in so much pain, that she is Crawling around her house, that she is not even walking. Patient is asking for some pain medication from . [facility] on [address]   Tel. [phone]. Patient tel. [phone].

## 2022-05-20 NOTE — TELEPHONE ENCOUNTER
Discussed with DR Zamora--unable to prescribe pain meds--give it more time--injection was yesterday--try tylenol, motrin--can offer therapy

## 2022-08-24 ENCOUNTER — OFFICE VISIT (OUTPATIENT)
Dept: ORTHOPEDIC SURGERY | Age: 57
End: 2022-08-24

## 2022-08-24 VITALS
TEMPERATURE: 97.3 F | BODY MASS INDEX: 26.99 KG/M2 | HEIGHT: 65 IN | WEIGHT: 162 LBS | OXYGEN SATURATION: 99 % | HEART RATE: 102 BPM

## 2022-08-24 DIAGNOSIS — M25.551 HIP PAIN, RIGHT: ICD-10-CM

## 2022-08-24 DIAGNOSIS — M87.051 AVASCULAR NECROSIS OF HIP, RIGHT (HCC): ICD-10-CM

## 2022-08-24 DIAGNOSIS — M16.11 PRIMARY OSTEOARTHRITIS OF RIGHT HIP: Primary | ICD-10-CM

## 2022-08-24 PROCEDURE — 99214 OFFICE O/P EST MOD 30 MIN: CPT | Performed by: SPECIALIST

## 2022-08-24 RX ORDER — HYDROCODONE BITARTRATE AND ACETAMINOPHEN 5; 325 MG/1; MG/1
TABLET ORAL
COMMUNITY
Start: 2022-07-26

## 2022-08-24 RX ORDER — DICLOFENAC SODIUM 75 MG/1
75 TABLET, DELAYED RELEASE ORAL 2 TIMES DAILY
COMMUNITY
Start: 2022-08-20

## 2022-08-24 NOTE — PROGRESS NOTES
Patient: Jet Sarabia                MRN: [id number]       SSN: [id number]  YOB: 1965        AGE: 49 y.o. SEX: female    PCP: None  08/24/22    CC: RIGHT HIP PAIN    HISTORY:  Jet Sarabia is a 49 y.o. female who is seen for is seen for increased right hip pain. She does not recall any injury. She has been experiencing right groin pain for the past several months. She feels increased pain with standing and walking. Her hip stiffens up after she sits for long periods of time. Recent x-rays and MRI revealed AVN with subchondral femoral head collapse. She was previously seen by Dr. Teri Berger. Pain Assessment  8/24/2022   Location of Pain Hip   Location Modifiers Right   Severity of Pain 10   Quality of Pain Throbbing   Duration of Pain Persistent   Frequency of Pain Constant   Date Pain First Started -   Date Pain First Started Comment -   Aggravating Factors Walking;Standing   Aggravating Factors Comment -   Limiting Behavior Yes   Relieving Factors Rest   Result of Injury -   Type of Injury -   Type of Injury Comment -     Occupation, etc:  Ms. Pro Argueta has not been employed for the past 10 years. She used to work as a CNA at [employer]. She lives with her  in [place]. She has 2 daughters, 1 son, 2 grandsons, and 2 granddaughters. Ms. Pro Argueta weighs 174 lbs and is 5'5\" tall. She has no history of diabetes or hypertension. No results found for: HBA1C, TAC7QVWC, OJS7AFQB, YNS5IIVD  Weight Metrics 8/24/2022 5/19/2022 5/12/2022 1/4/2022 4/29/2021 4/4/2021 11/13/2019   Weight 162 lb 174 lb 174 lb 130 lb 137 lb 130 lb 187 lb   BMI 26.96 kg/m2 28.96 kg/m2 28.96 kg/m2 21.63 kg/m2 22.8 kg/m2 21.63 kg/m2 31.12 kg/m2       Patient Active Problem List   Diagnosis Code    Asthma with exacerbation J45. 901     REVIEW OF SYSTEMS:    Constitutional Symptoms: Negative   Eyes: Negative   Ears, Nose, Throat and Mouth: Negative   Cardiovascular: Negative   Respiratory: Negative   Genitourinary: Per HPI   Gastrointestinal: Per HPI   Integumentary (Skin and/or Breast): Negative   Musculoskeletal: Per HPI   Endocrine/Rheumatologic: Negative   Neurological: Per HPI   Hematology/Lymphatic: Negative    Allergic/Immunologic: Negative   Phychiatric: Negative    Social History     Socioeconomic History    Marital status:      Spouse name: Not on file    Number of children: Not on file    Years of education: Not on file    Highest education level: Not on file   Occupational History    Not on file   Tobacco Use    Smoking status: Every Day     Packs/day: 0.25     Types: Cigarettes     Last attempt to quit: 3/1/2012     Years since quitting: 10.4    Smokeless tobacco: Never   Substance and Sexual Activity    Alcohol use: No    Drug use: Not Currently     Types: Cocaine     Comment: last uses 2015    Sexual activity: Not Currently   Other Topics Concern    Not on file   Social History Narrative    Not on file     Social Determinants of Health     Financial Resource Strain: Not on file   Food Insecurity: Not on file   Transportation Needs: Not on file   Physical Activity: Not on file   Stress: Not on file   Social Connections: Not on file   Intimate Partner Violence: Not on file   Housing Stability: Not on file      Allergies   Allergen Reactions    Egg Anaphylaxis    Ibuprofen Hives    Darvocet A500 [Propoxyphene N-Acetaminophen] Hives    Fish Containing Products Hives      Current Outpatient Medications   Medication Sig    HYDROcodone-acetaminophen (NORCO) 5-325 mg per tablet TAKE 1 TABLET BY MOUTH EVERY 8 HOURS AS NEEDED FOR SEVERE PAIN    diclofenac EC (VOLTAREN) 75 mg EC tablet Take 75 mg by mouth two (2) times a day. albuterol (PROVENTIL HFA, VENTOLIN HFA, PROAIR HFA) 90 mcg/actuation inhaler Take  by inhalation. (Patient not taking: Reported on 8/24/2022)     No current facility-administered medications for this visit.       PHYSICAL EXAMINATION:  Visit Vitals  Pulse (!) 102   Temp 97.3 °F (36.3 °C) (Temporal)   Ht 5' 5\" (1.651 m)   Wt 162 lb (73.5 kg)   SpO2 99%   BMI 26.96 kg/m²    Appearance: Alert, well appearing and pleasant patient who is in no distress, oriented to person, place/time, and who follows commands. HEENT: Cassie Ordoñez hears well, does not require hearing aids. Her sclera of the eyes are non-icteric. She is breathing normally and no respiratory accessory muscle use is noted. No JVD present and Neck ROM within normal limits. Psychiatric: Affect and mood are appropriate. Oriented x3  Cardiovascular/Peripheral Vascular: Normal pulses to each foot. Integumentary: No rashes. Warm and normal color. No drainage. Gait: antalgic, ambulating with one crutch  Sensory Exam: Intact/Normal Sensation    Lymphatic: No evidence of Lymphedema  Vascular:       Pulses: palpable  Varicosities none  Wounds/Abrasion: None Present  Neuro: Negative, no tremors  ORTHO EXAMINATION:  Examination Right hip Left hip   Skin Intact Intact   External Rotation ROM 10 10   Internal Rotation ROM 5 pain 10   Trochanteric tenderness + -   Hip flexion contracture + -   Antalgic gait + +   Trendelenberg sign + -   Lumbar tenderness - -   Straight leg raise - -   Calf tenderness - -   Neurovascular Intact Intact        MRI RIGHT HIP WO CON 08/10/22  IMPRESSION  Right femoral head osteonecrosis with mild articular surface collapse- stage III. RADIOGRAPHS:  XR RIGHT HIP 08/24/22 [facility]  IMPRESSION:  AP pelvis and two views - No fractures, severe joint space narrowing,  osteophytes present. Tonnis grade 4. Subchondral collapse. XR RIGHT HIP 5/12/22 [facility] ED  IMPRESSION  Right femoral head avascular necrosis with evidence of articular surface collapse is new compared to prior MRI. MRI could be considered to further characterize extent and acuity of disease. IMPRESSION:      ICD-10-CM ICD-9-CM    1. Primary osteoarthritis of right hip  M16.11 715.15       2.  Hip pain, right  M25.551 719.45 AMB POC X-RAY RADEX HIP UNI WITH PELVIS 2-3 VIEWS      3. Avascular necrosis of hip, right (HCC)  M87.051 733.42         PLAN:  She will be scheduled for a right total hip arthroplasty. Risks of surgery outlined and informed consent obtained. She will follow up for H&P.        Scribed by Helen Buchanan (Rosa Hensley) as dictated by Dilcia Simon MD

## 2022-10-21 ENCOUNTER — APPOINTMENT (OUTPATIENT)
Dept: GENERAL RADIOLOGY | Age: 57
End: 2022-10-21
Attending: EMERGENCY MEDICINE
Payer: COMMERCIAL

## 2022-10-21 ENCOUNTER — HOSPITAL ENCOUNTER (EMERGENCY)
Age: 57
Discharge: LEFT BEFORE DISCHARGE | End: 2022-10-21
Attending: EMERGENCY MEDICINE
Payer: COMMERCIAL

## 2022-10-21 VITALS
SYSTOLIC BLOOD PRESSURE: 101 MMHG | HEART RATE: 111 BPM | DIASTOLIC BLOOD PRESSURE: 82 MMHG | RESPIRATION RATE: 20 BRPM | TEMPERATURE: 97.9 F

## 2022-10-21 PROCEDURE — 75810000275 HC EMERGENCY DEPT VISIT NO LEVEL OF CARE

## 2022-10-21 NOTE — ED NOTES
XR called for pt multiple times. This RN out to waiting room, pt not in waiting room or outside department doors.

## 2022-12-19 ENCOUNTER — TELEPHONE (OUTPATIENT)
Dept: ORTHOPEDIC SURGERY | Age: 57
End: 2022-12-19

## 2022-12-19 NOTE — TELEPHONE ENCOUNTER
Called and spoke to Linda, and then to Dr Jj Mera. Patient was seen today by Dr Jj Mera and was having increased hip pain. I advised that when we last saw the patient, we were going to schedule for hip replacement. Upon further research, patient was contacted by Raffi Mercedes to advise that her current insurance didn't cover surgery. Dr Jj Mera said patient didn't remember that conversation. I offered to contact patient to schedule appointment with Dr Eveline Ahumada to further discuss, but was declined. No further action needed.

## 2022-12-19 NOTE — TELEPHONE ENCOUNTER
Komal Dong from Dr. Kurtis Melgar's office called to get an update from Dr. Nathan Good on the patient's last visit.      Dr. Francisco Tinajero office# [phone] [Follow-up Visit ___] : a follow-up visit  for [unfilled] [Family Member] : family member [Spouse] : spouse [Source: ______] : History obtained from [unfilled]

## 2023-02-08 ENCOUNTER — OFFICE VISIT (OUTPATIENT)
Dept: ORTHOPEDIC SURGERY | Age: 58
End: 2023-02-08
Payer: MEDICAID

## 2023-02-08 VITALS — OXYGEN SATURATION: 96 % | TEMPERATURE: 97.1 F | HEART RATE: 80 BPM | WEIGHT: 154 LBS | BODY MASS INDEX: 25.63 KG/M2

## 2023-02-08 DIAGNOSIS — M16.11 PRIMARY OSTEOARTHRITIS OF RIGHT HIP: Primary | ICD-10-CM

## 2023-02-08 DIAGNOSIS — M25.551 RIGHT HIP PAIN: ICD-10-CM

## 2023-02-08 DIAGNOSIS — M87.051 AVASCULAR NECROSIS OF HIP, RIGHT (HCC): ICD-10-CM

## 2023-02-08 NOTE — PROGRESS NOTES
Patient: Steffanie Hair                MRN: [id number]       SSN: [id number]  YOB: 1965        AGE: 56 y.o. SEX: female    PCP: Maria Isabel Worrell MD  02/08/23    CC: RIGHT HIP PAIN    HISTORY:  Steffanie Hair is a 56 y.o. female who is seen for is seen for reevaluation of right hip pain. She was last seen in the office on 8/24/2022 at which time right total hip arthroplasty was discussed. She continues to have the same issues with her right hip and endorses limping. She does not recall any injury. She has been experiencing right groin pain. She feels increased pain with standing and walking. Her hip stiffens up after she sits for long periods of time. Recent x-rays and MRI revealed AVN with subchondral femoral head collapse. She was previously seen by Dr. Lola Ma. Occupation, etc:  Ms. Nahomy Yip has not been employed for the past 10 years. 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 She has 2 daughters, 1 son, 2 grandsons, and 2 granddaughters. Ms. Nahomy Yip weighs 174 lbs and is 5'5\" tall. She has no history of diabetes or hypertension. She has lost a few pounds, used to weigh around 174 pounds and currently weighs 154 pounds. She reports history of asthma and takes Prednisone and inhaler on occasion as needed. No results found for: HBA1C, WNK7MXUN, ZKM7WXHG, HSU6HFPT  Weight Metrics 2/8/2023 8/24/2022 5/19/2022 5/12/2022 1/4/2022 4/29/2021 4/4/2021   Weight 154 lb 162 lb 174 lb 174 lb 130 lb 137 lb 130 lb   BMI 25.63 kg/m2 26.96 kg/m2 28.96 kg/m2 28.96 kg/m2 21.63 kg/m2 22.8 kg/m2 21.63 kg/m2       Patient Active Problem List   Diagnosis Code    Asthma with exacerbation J45. 901     REVIEW OF SYSTEMS:    Constitutional Symptoms: Negative   Eyes: Negative   Ears, Nose, Throat and Mouth: Negative   Cardiovascular: Negative   Respiratory: Negative   Genitourinary: Per HPI   Gastrointestinal: Per HPI   Integumentary (Skin and/or Breast): Negative   Musculoskeletal: Per HPI   Endocrine/Rheumatologic: Negative   Neurological: Per HPI   Hematology/Lymphatic: Negative    Allergic/Immunologic: Negative   Phychiatric: Negative    Social History     Socioeconomic History    Marital status:      Spouse name: Not on file    Number of children: Not on file    Years of education: Not on file    Highest education level: Not on file   Occupational History    Not on file   Tobacco Use    Smoking status: Every Day     Packs/day: 0.25     Types: Cigarettes     Last attempt to quit: 3/1/2012     Years since quitting: 10.9    Smokeless tobacco: Never   Substance and Sexual Activity    Alcohol use: No    Drug use: Not Currently     Types: Cocaine     Comment: last uses 2015    Sexual activity: Not Currently   Other Topics Concern    Not on file   Social History Narrative    Not on file     Social Determinants of Health     Financial Resource Strain: Not on file   Food Insecurity: Not on file   Transportation Needs: Not on file   Physical Activity: Not on file   Stress: Not on file   Social Connections: Not on file   Intimate Partner Violence: Not on file   Housing Stability: Not on file      Allergies   Allergen Reactions    Egg Anaphylaxis    Ibuprofen Hives    Darvocet A500 [Propoxyphene N-Acetaminophen] Hives    Fish Containing Products Hives      Current Outpatient Medications   Medication Sig    HYDROcodone-acetaminophen (NORCO) 5-325 mg per tablet TAKE 1 TABLET BY MOUTH EVERY 8 HOURS AS NEEDED FOR SEVERE PAIN    diclofenac EC (VOLTAREN) 75 mg EC tablet Take 75 mg by mouth two (2) times a day. albuterol (PROVENTIL HFA, VENTOLIN HFA, PROAIR HFA) 90 mcg/actuation inhaler Take  by inhalation. (Patient not taking: Reported on 8/24/2022)     No current facility-administered medications for this visit.       PHYSICAL EXAMINATION:  Visit Vitals  Pulse 80   Temp 97.1 °F (36.2 °C) (Temporal)   Wt 154 lb (69.9 kg)   SpO2 96%   BMI 25.63 kg/m²      Appearance: Alert, well appearing and pleasant patient who is in no distress, oriented to person, place/time, and who follows commands. HEENT: Edson Paula hears well, does not require hearing aids. Her sclera of the eyes are non-icteric. She is breathing normally and no respiratory accessory muscle use is noted. No JVD present and Neck ROM within normal limits. Psychiatric: Affect and mood are appropriate. Oriented x3  Cardiovascular/Peripheral Vascular: Normal pulses to each foot. Integumentary: No rashes. Warm and normal color. No drainage. Gait: antalgic, ambulating with one crutch  Sensory Exam: Intact/Normal Sensation    Lymphatic: No evidence of Lymphedema  Vascular:       Pulses: palpable  Varicosities none  Wounds/Abrasion: None Present  Neuro: Negative, no tremors  ORTHO EXAMINATION:  Examination Right hip Left hip   Skin Intact Intact   External Rotation ROM 10 10   Internal Rotation ROM 5 pain 10   Trochanteric tenderness + -   Hip flexion contracture + -   Antalgic gait + +   Trendelenberg sign + -   Lumbar tenderness - -   Straight leg raise - -   Calf tenderness - -   Neurovascular Intact Intact      Pain with internal and external rotation    MRI RIGHT HIP WO CON 08/10/22  IMPRESSION  Right femoral head osteonecrosis with mild articular surface collapse- stage III. RADIOGRAPHS:  XR RIGHT HIP 2/08/23 [facility]: no fractures, mod severe joint space narrowing, ++ osteophytes present. Femoral head lucency and collapse c/w AVN. XR RIGHT HIP 08/24/22 [facility]  IMPRESSION:  AP pelvis and two views - No fractures, severe joint space narrowing,  osteophytes present. Tonnis grade 4. Subchondral collapse. XR RIGHT HIP 5/12/22 [facility] ED  IMPRESSION  Right femoral head avascular necrosis with evidence of articular surface collapse is new compared to prior MRI. MRI could be considered to further characterize extent and acuity of disease. IMPRESSION:      ICD-10-CM ICD-9-CM    1.  Primary osteoarthritis of right hip M16.11 715.15       2. Right hip pain  M25.551 719.45 AMB POC X-RAY RADEX HIP UNI WITH PELVIS 2-3 VIEWS      3. Avascular necrosis of hip, right (HCC)  M87.051 733.42           PLAN:  She will be scheduled for a right total hip arthroplasty. Risks of surgery outlined and informed consent obtained. Was instructed to continue to work on her diet and exercise. She will follow up for H&P.        Scribed by Nicole Munoz) as dictated by Amos Melvin MD

## 2023-04-05 DIAGNOSIS — M16.12 PRIMARY OSTEOARTHRITIS OF LEFT HIP: Primary | ICD-10-CM

## 2023-04-05 DIAGNOSIS — Z01.810 PREOP CARDIOVASCULAR EXAM: ICD-10-CM

## 2023-04-05 DIAGNOSIS — Z01.818 PREOPERATIVE TESTING: ICD-10-CM

## 2023-04-05 DIAGNOSIS — Z01.811 PRE-OP CHEST EXAM: ICD-10-CM

## 2023-04-14 ENCOUNTER — HOSPITAL ENCOUNTER (OUTPATIENT)
Facility: HOSPITAL | Age: 58
Discharge: HOME | End: 2023-04-17
Payer: MEDICAID

## 2023-04-14 DIAGNOSIS — M16.12 PRIMARY OSTEOARTHRITIS OF LEFT HIP: ICD-10-CM

## 2023-04-14 DIAGNOSIS — Z01.818 PREOPERATIVE TESTING: ICD-10-CM

## 2023-04-14 DIAGNOSIS — Z01.810 PREOP CARDIOVASCULAR EXAM: ICD-10-CM

## 2023-04-14 DIAGNOSIS — Z01.811 PRE-OP CHEST EXAM: ICD-10-CM

## 2023-04-14 LAB
ABO + RH BLD: NORMAL
ALBUMIN SERPL-MCNC: 4.1 G/DL (ref 3.4–5)
ALBUMIN/GLOB SERPL: 1.1 (ref 0.8–1.7)
ALP SERPL-CCNC: 103 U/L (ref 45–117)
ALT SERPL-CCNC: 36 U/L (ref 13–56)
ANION GAP SERPL CALC-SCNC: 7 MMOL/L (ref 3–18)
APPEARANCE UR: CLEAR
APTT PPP: 29.7 SEC (ref 23–36.4)
AST SERPL-CCNC: 24 U/L (ref 10–38)
BACTERIA URNS QL MICRO: ABNORMAL /HPF
BASOPHILS # BLD: 0 K/UL (ref 0–0.1)
BASOPHILS NFR BLD: 0 % (ref 0–2)
BILIRUB SERPL-MCNC: 0.4 MG/DL (ref 0.2–1)
BILIRUB UR QL: NEGATIVE
BLOOD GROUP ANTIBODIES SERPL: NORMAL
BUN SERPL-MCNC: 14 MG/DL (ref 7–18)
BUN/CREAT SERPL: 20 (ref 12–20)
CALCIUM SERPL-MCNC: 9.7 MG/DL (ref 8.5–10.1)
CHLORIDE SERPL-SCNC: 104 MMOL/L (ref 100–111)
CO2 SERPL-SCNC: 27 MMOL/L (ref 21–32)
COLOR UR: ABNORMAL
CREAT SERPL-MCNC: 0.7 MG/DL (ref 0.6–1.3)
DIFFERENTIAL METHOD BLD: NORMAL
EOSINOPHIL # BLD: 0.1 K/UL (ref 0–0.4)
EOSINOPHIL NFR BLD: 1 % (ref 0–5)
EPITH CASTS URNS QL MICRO: ABNORMAL /LPF (ref 0–5)
ERYTHROCYTE [DISTWIDTH] IN BLOOD BY AUTOMATED COUNT: 12.1 % (ref 11.6–14.5)
EST. AVERAGE GLUCOSE BLD GHB EST-MCNC: 108 MG/DL
GLOBULIN SER CALC-MCNC: 3.7 G/DL (ref 2–4)
GLUCOSE SERPL-MCNC: 83 MG/DL (ref 74–99)
GLUCOSE UR STRIP.AUTO-MCNC: NEGATIVE MG/DL
HBA1C MFR BLD: 5.4 % (ref 4.2–5.6)
HCT VFR BLD AUTO: 40.6 % (ref 35–45)
HGB BLD-MCNC: 12.9 G/DL (ref 12–16)
HGB UR QL STRIP: ABNORMAL
IMM GRANULOCYTES # BLD AUTO: 0 K/UL (ref 0–0.04)
IMM GRANULOCYTES NFR BLD AUTO: 0 % (ref 0–0.5)
INR PPP: 1 (ref 0.8–1.2)
KETONES UR QL STRIP.AUTO: ABNORMAL MG/DL
LEUKOCYTE ESTERASE UR QL STRIP.AUTO: ABNORMAL
LYMPHOCYTES # BLD: 2.5 K/UL (ref 0.9–3.6)
LYMPHOCYTES NFR BLD: 31 % (ref 21–52)
MCH RBC QN AUTO: 29.7 PG (ref 24–34)
MCHC RBC AUTO-ENTMCNC: 31.8 G/DL (ref 31–37)
MCV RBC AUTO: 93.3 FL (ref 78–100)
MONOCYTES # BLD: 0.4 K/UL (ref 0.05–1.2)
MONOCYTES NFR BLD: 5 % (ref 3–10)
NEUTS SEG # BLD: 5 K/UL (ref 1.8–8)
NEUTS SEG NFR BLD: 62 % (ref 40–73)
NITRITE UR QL STRIP.AUTO: NEGATIVE
NRBC # BLD: 0 K/UL (ref 0–0.01)
NRBC BLD-RTO: 0 PER 100 WBC
PH UR STRIP: 5.5 (ref 5–8)
PLATELET # BLD AUTO: 209 K/UL (ref 135–420)
PMV BLD AUTO: 11.4 FL (ref 9.2–11.8)
POTASSIUM SERPL-SCNC: 3.6 MMOL/L (ref 3.5–5.5)
PROT SERPL-MCNC: 7.8 G/DL (ref 6.4–8.2)
PROT UR STRIP-MCNC: NEGATIVE MG/DL
PROTHROMBIN TIME: 13.4 SEC (ref 11.5–15.2)
RBC # BLD AUTO: 4.35 M/UL (ref 4.2–5.3)
RBC #/AREA URNS HPF: ABNORMAL /HPF (ref 0–5)
SODIUM SERPL-SCNC: 138 MMOL/L (ref 136–145)
SP GR UR REFRACTOMETRY: 1.03 (ref 1–1.03)
SPECIMEN EXP DATE BLD: NORMAL
UROBILINOGEN UR QL STRIP.AUTO: 1 EU/DL (ref 0.2–1)
WBC # BLD AUTO: 8 K/UL (ref 4.6–13.2)
WBC URNS QL MICRO: ABNORMAL /HPF (ref 0–4)

## 2023-04-14 PROCEDURE — 80053 COMPREHEN METABOLIC PANEL: CPT

## 2023-04-14 PROCEDURE — 85025 COMPLETE CBC W/AUTO DIFF WBC: CPT

## 2023-04-14 PROCEDURE — 85610 PROTHROMBIN TIME: CPT

## 2023-04-14 PROCEDURE — 81001 URINALYSIS AUTO W/SCOPE: CPT

## 2023-04-14 PROCEDURE — 36415 COLL VENOUS BLD VENIPUNCTURE: CPT

## 2023-04-14 PROCEDURE — 86900 BLOOD TYPING SEROLOGIC ABO: CPT

## 2023-04-14 PROCEDURE — 83036 HEMOGLOBIN GLYCOSYLATED A1C: CPT

## 2023-04-14 PROCEDURE — 93005 ELECTROCARDIOGRAM TRACING: CPT

## 2023-04-14 PROCEDURE — 85730 THROMBOPLASTIN TIME PARTIAL: CPT

## 2023-04-14 PROCEDURE — 71046 X-RAY EXAM CHEST 2 VIEWS: CPT

## 2023-04-15 LAB
EKG ATRIAL RATE: 96 BPM
EKG DIAGNOSIS: NORMAL
EKG P AXIS: 49 DEGREES
EKG P-R INTERVAL: 168 MS
EKG Q-T INTERVAL: 398 MS
EKG QRS DURATION: 72 MS
EKG QTC CALCULATION (BAZETT): 502 MS
EKG R AXIS: -6 DEGREES
EKG T AXIS: 28 DEGREES
EKG VENTRICULAR RATE: 96 BPM

## 2023-04-15 PROCEDURE — 93010 ELECTROCARDIOGRAM REPORT: CPT | Performed by: INTERNAL MEDICINE

## 2023-04-17 ENCOUNTER — OFFICE VISIT (OUTPATIENT)
Age: 58
End: 2023-04-17

## 2023-04-17 VITALS — HEIGHT: 65 IN | TEMPERATURE: 97.3 F | BODY MASS INDEX: 24.32 KG/M2 | WEIGHT: 146 LBS

## 2023-04-17 DIAGNOSIS — M25.551 PAIN IN RIGHT HIP: ICD-10-CM

## 2023-04-17 DIAGNOSIS — M16.11 UNILATERAL PRIMARY OSTEOARTHRITIS, RIGHT HIP: Primary | ICD-10-CM

## 2023-04-17 RX ORDER — GABAPENTIN 100 MG/1
300 CAPSULE ORAL
OUTPATIENT
Start: 2023-04-25 | End: 2023-04-25

## 2023-04-17 RX ORDER — ACETAMINOPHEN 325 MG/1
650 TABLET ORAL
OUTPATIENT
Start: 2023-04-25 | End: 2023-04-25

## 2023-04-17 RX ORDER — APREPITANT 40 MG/1
40 CAPSULE ORAL
OUTPATIENT
Start: 2023-04-25 | End: 2023-04-25

## 2023-04-17 NOTE — H&P
symptoms and return precautions associated with chief complaint and evaluation were reviewed with the patient in detail. The patient demonstrated adequate understanding. The patient expresses willing compliance with the treatment plan. Special note: Medication management discussed in detail all patient's questions answered to their satisfaction. Patient provided a reminder for a \"due or due soon\" health maintenance. I have asked the patient to schedule an appointment with their primary care provider for follow-up on general health maintenance concerns. Today all the patient's questions were answered to their satisfaction. Copies of x-rays reviewed if obtained this visit, and provided to patient. Dictation disclaimer:  Please note that this dictation was completed with \"Dragon\", the computer voice recognition software. Today's exam was dictated using fluency dictation software (voice recognition software). Occasionally, sound-a-like computer induced   dictation errors will populate the report. Quite often unanticipated grammatical, syntax, homophones, and other interpretive errors are inadvertently transcribed by the computer software. Please disregard these errors. Please excuse any errors that have escaped final proofreading. Torin FLETCHER, APC, MPAS, PA-C  [facility]

## 2023-04-18 RX ORDER — OXYCODONE AND ACETAMINOPHEN 7.5; 325 MG/1; MG/1
1 TABLET ORAL EVERY 12 HOURS PRN
COMMUNITY
Start: 2023-03-17

## 2023-04-24 NOTE — NURSE NAVIGATOR
Call placed to patient, ID verified x 2. Patient  has decided with their surgeon to have a total hip replacement to decrease  pain and improve mobility . Topics discussed included surgery preparation, what to expect the day of surgery, medications, physical and occupational therapy, and discharge planning. It was discussed that this is considered an elective surgery and that prior to the surgery  decisions such as arranging for help at home once they are discharged needs to be made. Patient agreed to get home ready for surgery and to have a ride arranged to go home. She identifies her  as her support system, has all required DME and is open to going home if she can pass physical therapy on 2 surgical, pain is controlled and she does not have problems with nausea or vomiting. Instructions were given for CHG bathing. She denies any rashes, bruises or open areas to her skin at this time. Patient will complete the procedure the morning of surgery. Patient was reminded not to apply any deoderant, perfumes, makeup or lotions to skin the morning of surgery. She will remain NPO after midnight the night before surgery and will take only the medications as instructed to take by her surgeon the morning of surgery with a sip of water. Patient states that she has no medications that she is required to take the morning of surgery. A total hip replacement education  book was  provided at the clinic level and reviewed with patient on the phone. Education regarding the importance of early and frequent ambulation to avoid surgical complications and to assist with pain was provided. Recommended the use of ice to assist with pain and swelling post op for 20 minutes an hour, not to be placed directly on her skin. Patient verbalized understanding of all information provided.   Opportunity was given to ask questions and phone number of the Orthopaedic   was given for any questions or concerns that may arise

## 2023-04-25 ENCOUNTER — HOSPITAL ENCOUNTER (OUTPATIENT)
Facility: HOSPITAL | Age: 58
Discharge: HOME | End: 2023-04-25
Attending: SPECIALIST | Admitting: SPECIALIST
Payer: MEDICAID

## 2023-04-25 VITALS
DIASTOLIC BLOOD PRESSURE: 77 MMHG | RESPIRATION RATE: 12 BRPM | BODY MASS INDEX: 24.99 KG/M2 | TEMPERATURE: 98.7 F | WEIGHT: 150 LBS | HEIGHT: 65 IN | SYSTOLIC BLOOD PRESSURE: 120 MMHG | HEART RATE: 83 BPM | OXYGEN SATURATION: 100 %

## 2023-04-25 LAB
AMPHET UR QL SCN: NEGATIVE
BARBITURATES UR QL SCN: NEGATIVE
BENZODIAZ UR QL: NEGATIVE
CANNABINOIDS UR QL SCN: POSITIVE
COCAINE UR QL SCN: POSITIVE
Lab: ABNORMAL
METHADONE UR QL: NEGATIVE
OPIATES UR QL: POSITIVE
PCP UR QL: NEGATIVE

## 2023-04-25 PROCEDURE — 2580000003 HC RX 258: Performed by: SPECIALIST

## 2023-04-25 PROCEDURE — 80307 DRUG TEST PRSMV CHEM ANLYZR: CPT

## 2023-04-25 RX ORDER — GABAPENTIN 300 MG/1
300 CAPSULE ORAL
Status: DISCONTINUED | OUTPATIENT
Start: 2023-04-25 | End: 2023-04-25 | Stop reason: HOSPADM

## 2023-04-25 RX ORDER — SODIUM CHLORIDE, SODIUM LACTATE, POTASSIUM CHLORIDE, CALCIUM CHLORIDE 600; 310; 30; 20 MG/100ML; MG/100ML; MG/100ML; MG/100ML
INJECTION, SOLUTION INTRAVENOUS CONTINUOUS
Status: DISCONTINUED | OUTPATIENT
Start: 2023-04-25 | End: 2023-04-25 | Stop reason: HOSPADM

## 2023-04-25 RX ORDER — APREPITANT 40 MG/1
40 CAPSULE ORAL
Status: DISCONTINUED | OUTPATIENT
Start: 2023-04-25 | End: 2023-04-25 | Stop reason: HOSPADM

## 2023-04-25 RX ORDER — ACETAMINOPHEN 325 MG/1
650 TABLET ORAL
Status: DISCONTINUED | OUTPATIENT
Start: 2023-04-25 | End: 2023-04-25 | Stop reason: HOSPADM

## 2023-04-25 RX ADMIN — SODIUM CHLORIDE, POTASSIUM CHLORIDE, SODIUM LACTATE AND CALCIUM CHLORIDE: 600; 310; 30; 20 INJECTION, SOLUTION INTRAVENOUS at 06:20

## 2023-04-25 ASSESSMENT — PAIN - FUNCTIONAL ASSESSMENT: PAIN_FUNCTIONAL_ASSESSMENT: 0-10

## 2023-04-25 NOTE — CONSULTS
OR cancelled for cocaine + UDS  Discussed with pt   Latest Reference Range & Units 04/25/23 06:00   Opiates, Urine NEG   Positive ! URINE DRUG SCREEN  Rpt ! Amphetamine, Urine NEG   Negative   Benzodiazepines, Urine NEG   Negative   Barbiturates, Urine NEG   Negative   Cocaine, Urine NEG   Positive !    PCP, Urine NEG   Negative   THC, TH-Cannabinol, Urine NEG   Positive !   !: Data is abnormal  Rpt: View report in Results Review for more information

## 2023-06-19 DIAGNOSIS — Z01.818 PREOPERATIVE TESTING: Primary | ICD-10-CM

## 2023-08-10 ENCOUNTER — APPOINTMENT (OUTPATIENT)
Facility: HOSPITAL | Age: 58
End: 2023-08-10

## 2023-08-10 ENCOUNTER — HOSPITAL ENCOUNTER (EMERGENCY)
Facility: HOSPITAL | Age: 58
Discharge: LEFT BEFORE DISCHARGE | End: 2023-08-10

## 2023-08-10 VITALS
BODY MASS INDEX: 24.99 KG/M2 | OXYGEN SATURATION: 98 % | WEIGHT: 150 LBS | RESPIRATION RATE: 18 BRPM | HEART RATE: 89 BPM | SYSTOLIC BLOOD PRESSURE: 114 MMHG | TEMPERATURE: 98.2 F | HEIGHT: 65 IN | DIASTOLIC BLOOD PRESSURE: 80 MMHG

## 2023-08-10 ASSESSMENT — PAIN - FUNCTIONAL ASSESSMENT: PAIN_FUNCTIONAL_ASSESSMENT: 0-10

## 2023-08-10 ASSESSMENT — PAIN SCALES - GENERAL: PAINLEVEL_OUTOF10: 9

## 2023-08-10 ASSESSMENT — PAIN DESCRIPTION - ORIENTATION: ORIENTATION: LEFT

## 2023-08-10 ASSESSMENT — PAIN DESCRIPTION - LOCATION: LOCATION: TOE (COMMENT WHICH ONE)
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
## 2023-08-11 ENCOUNTER — APPOINTMENT (OUTPATIENT)
Facility: HOSPITAL | Age: 58
End: 2023-08-11
Payer: MEDICAID

## 2023-08-11 ENCOUNTER — HOSPITAL ENCOUNTER (EMERGENCY)
Facility: HOSPITAL | Age: 58
Discharge: HOME | End: 2023-08-11
Attending: EMERGENCY MEDICINE
Payer: MEDICAID

## 2023-08-11 VITALS
HEIGHT: 65 IN | SYSTOLIC BLOOD PRESSURE: 130 MMHG | DIASTOLIC BLOOD PRESSURE: 89 MMHG | RESPIRATION RATE: 18 BRPM | WEIGHT: 150 LBS | HEART RATE: 116 BPM | OXYGEN SATURATION: 100 % | TEMPERATURE: 97.8 F | BODY MASS INDEX: 24.99 KG/M2

## 2023-08-11 DIAGNOSIS — S90.112A CONTUSION OF LEFT GREAT TOE WITHOUT DAMAGE TO NAIL, INITIAL ENCOUNTER: Primary | ICD-10-CM

## 2023-08-11 PROCEDURE — 99283 EMERGENCY DEPT VISIT LOW MDM: CPT

## 2023-08-11 PROCEDURE — 73630 X-RAY EXAM OF FOOT: CPT

## 2023-08-11 RX ORDER — TRAMADOL HYDROCHLORIDE 50 MG/1
50 TABLET ORAL EVERY 4 HOURS PRN
Qty: 12 TABLET | Refills: 0 | Status: SHIPPED | OUTPATIENT
Start: 2023-08-11 | End: 2023-08-14

## 2023-08-11 ASSESSMENT — PAIN DESCRIPTION - FREQUENCY: FREQUENCY: INTERMITTENT

## 2023-08-11 ASSESSMENT — LIFESTYLE VARIABLES
HOW MANY STANDARD DRINKS CONTAINING ALCOHOL DO YOU HAVE ON A TYPICAL DAY: PATIENT DOES NOT DRINK
HOW OFTEN DO YOU HAVE A DRINK CONTAINING ALCOHOL: NEVER

## 2023-08-11 ASSESSMENT — PAIN DESCRIPTION - ONSET: ONSET: ON-GOING

## 2023-08-11 ASSESSMENT — PAIN DESCRIPTION - DESCRIPTORS: DESCRIPTORS: ACHING

## 2023-08-11 ASSESSMENT — PAIN DESCRIPTION - PAIN TYPE: TYPE: ACUTE PAIN

## 2023-08-11 ASSESSMENT — PAIN DESCRIPTION - ORIENTATION: ORIENTATION: LEFT

## 2023-08-11 ASSESSMENT — PAIN DESCRIPTION - LOCATION: LOCATION: FOOT;TOE (COMMENT WHICH ONE)

## 2023-08-11 ASSESSMENT — PAIN SCALES - GENERAL: PAINLEVEL_OUTOF10: 10

## 2023-08-11 ASSESSMENT — PAIN - FUNCTIONAL ASSESSMENT: PAIN_FUNCTIONAL_ASSESSMENT: 0-10

## 2023-08-11 NOTE — ED NOTES
Patient's name called multiple times in the waiting room with no response.       Petra Marquis RN  08/10/23 [id number]

## 2023-08-11 NOTE — ED NOTES
Patient discharged at this time. This RN reviewed discharge instructions at this time with the patient. patient verbalized understanding and does not have any questions. Pt ambulatory upon discharge, & in stable condition. Pt armband removed & shredded.        Chip Fiore RN  08/11/23 0798
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
## 2023-08-21 ENCOUNTER — APPOINTMENT (OUTPATIENT)
Facility: HOSPITAL | Age: 58
End: 2023-08-21
Payer: MEDICAID

## 2023-08-21 ENCOUNTER — HOSPITAL ENCOUNTER (EMERGENCY)
Facility: HOSPITAL | Age: 58
Discharge: HOME | End: 2023-08-21
Attending: STUDENT IN AN ORGANIZED HEALTH CARE EDUCATION/TRAINING PROGRAM
Payer: MEDICAID

## 2023-08-21 VITALS
DIASTOLIC BLOOD PRESSURE: 69 MMHG | HEIGHT: 65 IN | WEIGHT: 150 LBS | HEART RATE: 108 BPM | RESPIRATION RATE: 17 BRPM | TEMPERATURE: 98.1 F | SYSTOLIC BLOOD PRESSURE: 122 MMHG | BODY MASS INDEX: 24.99 KG/M2 | OXYGEN SATURATION: 100 %

## 2023-08-21 DIAGNOSIS — S92.501A CLOSED FRACTURE OF PHALANX OF RIGHT FOURTH TOE, INITIAL ENCOUNTER: Primary | ICD-10-CM

## 2023-08-21 PROCEDURE — 99283 EMERGENCY DEPT VISIT LOW MDM: CPT

## 2023-08-21 PROCEDURE — 73660 X-RAY EXAM OF TOE(S): CPT

## 2023-08-21 PROCEDURE — 6370000000 HC RX 637 (ALT 250 FOR IP): Performed by: STUDENT IN AN ORGANIZED HEALTH CARE EDUCATION/TRAINING PROGRAM

## 2023-08-21 PROCEDURE — 28515 TREATMENT OF TOE FRACTURE: CPT

## 2023-08-21 RX ORDER — ACETAMINOPHEN 500 MG
1000 TABLET ORAL
Status: COMPLETED | OUTPATIENT
Start: 2023-08-21 | End: 2023-08-21

## 2023-08-21 RX ADMIN — ACETAMINOPHEN 1000 MG: 500 TABLET ORAL at 02:47

## 2023-08-21 ASSESSMENT — PAIN DESCRIPTION - LOCATION: LOCATION: TOE (COMMENT WHICH ONE)

## 2023-08-21 ASSESSMENT — PAIN - FUNCTIONAL ASSESSMENT: PAIN_FUNCTIONAL_ASSESSMENT: 0-10

## 2023-08-21 ASSESSMENT — PAIN DESCRIPTION - FREQUENCY: FREQUENCY: CONTINUOUS

## 2023-08-21 ASSESSMENT — PAIN DESCRIPTION - PAIN TYPE: TYPE: ACUTE PAIN

## 2023-08-21 ASSESSMENT — PAIN DESCRIPTION - ORIENTATION: ORIENTATION: RIGHT

## 2023-08-21 ASSESSMENT — LIFESTYLE VARIABLES
HOW OFTEN DO YOU HAVE A DRINK CONTAINING ALCOHOL: 2-4 TIMES A MONTH
HOW MANY STANDARD DRINKS CONTAINING ALCOHOL DO YOU HAVE ON A TYPICAL DAY: 1 OR 2

## 2023-08-21 NOTE — DISCHARGE INSTRUCTIONS
Recommend extreme Tylenol for 6 hours and ibuprofen 600 mg every 6 hours as needed for pain and discomfort. Use walking shoe whenever you are moving around. Follow-up with podiatry.

## 2023-08-21 NOTE — ED NOTES
Administered medication and educated patient on side effects. Patient allergies verified. All questions and concerns answered. Pt instructed to use call bell at the bedside if needed. This nurse will continues to monitor pt at this time. Side rails up. Patient discharged at this time. This RN reviewed discharge instructions at this time with the patient. patient verbalized understanding and does not have any questions. Pt ambulatory upon discharge, & in stable condition. Pt armband removed & shredded.         Chip Fiore RN  08/21/23 [id number]

## 2023-09-05 NOTE — ED PROVIDER NOTES
[facility] EMERGENCY DEPT  EMERGENCY DEPARTMENT ENCOUNTER      Pt Name: Alessandro Jeronimo  MRN: [id number]  [address] [facility] 1965  Date of evaluation: 8/21/2023  Provider: Reyna Meek MD    CHIEF COMPLAINT       Chief Complaint   Patient presents with    Toe Injury         HISTORY OF PRESENT ILLNESS   (Location/Symptom, Timing/Onset, Context/Setting, Quality, Duration, Modifying Factors, Severity)  Note limiting factors. Alessandro Jeronimo is a 56 y.o. female who presents to the emergency department for right foot pain. Patient states that yesterday she stubbed her foot on an object in her house. Denies any prodromal symptoms. She has been able to ambulate on her heel however has pain when she applies pressure to the front of her foot. Has not been taking anything for pain. Noticed some swelling worsening pain in her toe prompted her to come to the emergency department. No other injuries or trauma. Nursing Notes were reviewed. REVIEW OF SYSTEMS    (2-9 systems for level 4, 10 or more for level 5)     Constitutional: No fever  Respiratory: No SOB  Cardio: No chest pain  Neuro: No headache    Except as noted above the remainder of the review of systems was reviewed and negative.        PAST MEDICAL HISTORY     Past Medical History:   Diagnosis Date    Arthritis     Asthma     Bilateral leg pain     Carpal tunnel syndrome, right     symptoms    Cellulitis of face     Cocaine dependence with other cocaine-induced disorder (HCC)     DDD (degenerative disc disease), lumbar     mild    Eczema     Hypokalemia     Infectious disease     sphills     Lower back pain     Migraines     Myalgia     lumbar    Numbness and tingling in right hand     Pinched nerve     Sciatica     right lower extremity    Trichomonal vaginitis     Urticaria     UTI (urinary tract infection)     Vasovagal syncope          SURGICAL HISTORY       Past Surgical History:   Procedure Laterality Date    PARTIAL HYSTERECTOMY (CERVIX NOT REMOVED)

## 2023-11-23 ENCOUNTER — APPOINTMENT (OUTPATIENT)
Facility: HOSPITAL | Age: 58
End: 2023-11-23
Payer: MEDICAID

## 2023-11-23 ENCOUNTER — HOSPITAL ENCOUNTER (EMERGENCY)
Facility: HOSPITAL | Age: 58
Discharge: HOME | End: 2023-11-23
Attending: STUDENT IN AN ORGANIZED HEALTH CARE EDUCATION/TRAINING PROGRAM
Payer: MEDICAID

## 2023-11-23 VITALS
DIASTOLIC BLOOD PRESSURE: 116 MMHG | RESPIRATION RATE: 20 BRPM | HEART RATE: 98 BPM | SYSTOLIC BLOOD PRESSURE: 139 MMHG | HEIGHT: 65 IN | WEIGHT: 148 LBS | OXYGEN SATURATION: 98 % | TEMPERATURE: 97.5 F | BODY MASS INDEX: 24.66 KG/M2

## 2023-11-23 DIAGNOSIS — J45.21 MILD INTERMITTENT ASTHMA WITH ACUTE EXACERBATION: Primary | ICD-10-CM

## 2023-11-23 PROCEDURE — 6370000000 HC RX 637 (ALT 250 FOR IP): Performed by: STUDENT IN AN ORGANIZED HEALTH CARE EDUCATION/TRAINING PROGRAM

## 2023-11-23 PROCEDURE — 6360000002 HC RX W HCPCS: Performed by: STUDENT IN AN ORGANIZED HEALTH CARE EDUCATION/TRAINING PROGRAM

## 2023-11-23 PROCEDURE — 99283 EMERGENCY DEPT VISIT LOW MDM: CPT

## 2023-11-23 PROCEDURE — 93005 ELECTROCARDIOGRAM TRACING: CPT | Performed by: STUDENT IN AN ORGANIZED HEALTH CARE EDUCATION/TRAINING PROGRAM

## 2023-11-23 PROCEDURE — 6370000000 HC RX 637 (ALT 250 FOR IP): Performed by: HEALTH CARE PROVIDER

## 2023-11-23 PROCEDURE — 71046 X-RAY EXAM CHEST 2 VIEWS: CPT

## 2023-11-23 RX ORDER — ALBUTEROL SULFATE 90 UG/1
2 AEROSOL, METERED RESPIRATORY (INHALATION) EVERY 6 HOURS PRN
Qty: 18 G | Refills: 2 | Status: SHIPPED | OUTPATIENT
Start: 2023-11-23

## 2023-11-23 RX ORDER — IPRATROPIUM BROMIDE AND ALBUTEROL SULFATE 2.5; .5 MG/3ML; MG/3ML
1 SOLUTION RESPIRATORY (INHALATION)
Status: COMPLETED | OUTPATIENT
Start: 2023-11-23 | End: 2023-11-23

## 2023-11-23 RX ORDER — ALBUTEROL SULFATE 2.5 MG/3ML
2.5 SOLUTION RESPIRATORY (INHALATION) ONCE
OUTPATIENT
Start: 2023-11-23

## 2023-11-23 RX ORDER — DEXAMETHASONE SODIUM PHOSPHATE 4 MG/ML
10 INJECTION, SOLUTION INTRA-ARTICULAR; INTRALESIONAL; INTRAMUSCULAR; INTRAVENOUS; SOFT TISSUE ONCE
Status: COMPLETED | OUTPATIENT
Start: 2023-11-23 | End: 2023-11-23

## 2023-11-23 RX ORDER — DEXAMETHASONE 4 MG/1
10 TABLET ORAL ONCE
Status: DISCONTINUED | OUTPATIENT
Start: 2023-11-23 | End: 2023-11-23

## 2023-11-23 RX ORDER — DEXAMETHASONE 6 MG/1
12 TABLET ORAL ONCE
Qty: 2 TABLET | Refills: 0 | Status: SHIPPED | OUTPATIENT
Start: 2023-11-23 | End: 2023-11-23

## 2023-11-23 RX ORDER — IPRATROPIUM BROMIDE AND ALBUTEROL SULFATE 2.5; .5 MG/3ML; MG/3ML
3 SOLUTION RESPIRATORY (INHALATION)
Status: COMPLETED | OUTPATIENT
Start: 2023-11-23 | End: 2023-11-23

## 2023-11-23 RX ADMIN — DEXAMETHASONE SODIUM PHOSPHATE 10 MG: 4 INJECTION, SOLUTION INTRAMUSCULAR; INTRAVENOUS at 16:54

## 2023-11-23 RX ADMIN — IPRATROPIUM BROMIDE AND ALBUTEROL SULFATE 3 DOSE: .5; 3 SOLUTION RESPIRATORY (INHALATION) at 16:54

## 2023-11-23 RX ADMIN — IPRATROPIUM BROMIDE AND ALBUTEROL SULFATE 1 DOSE: .5; 3 SOLUTION RESPIRATORY (INHALATION) at 16:48

## 2023-11-23 ASSESSMENT — PAIN SCALES - GENERAL: PAINLEVEL_OUTOF10: 0

## 2023-11-23 ASSESSMENT — PAIN - FUNCTIONAL ASSESSMENT: PAIN_FUNCTIONAL_ASSESSMENT: 0-10

## 2023-11-23 NOTE — ED PROVIDER NOTES
Medicare Annual Wellness Visit    Chino Gtz is here for Medicare AWV    Assessment & Plan   Medicare annual wellness visit, subsequent  Nicotine dependence, cigarettes, uncomplicated  Elevated hemoglobin ([address])  -     CBC with Auto Differential; Future  Irregular heartbeat  -     EKG 12 Lead  Mixed hyperlipidemia  -     Comprehensive Metabolic Panel; Future  -     Lipid Panel; Future  Primary hypertension  -     CBC with Auto Differential; Future  -     Comprehensive Metabolic Panel; Future  Acquired hypothyroidism  -     TSH; Future  Hypovitaminosis D  Fasting hyperglycemia  -     Comprehensive Metabolic Panel; Future  Recommendations for Preventive Services Due: see orders and patient instructions/AVS.  Recommended screening schedule for the next 5-10 years is provided to the patient in written form: see Patient Instructions/AVS.     Return in about 4 months (around 11/20/2023) for Follow-Up, HLD, with labs. Subjective   The following acute and/or chronic problems were also addressed today:  Hypothyroidism, hyperlipidemia, hypertension, elevated fasting glucose, elevated hemoglobin    Patient's complete Health Risk Assessment and screening values have been reviewed and are found in Flowsheets. The following problems were reviewed today and where indicated follow up appointments were made and/or referrals ordered. Positive Risk Factor Screenings with Interventions:    Fall Risk:  Do you feel unsteady or are you worried about falling? : (!) yes  2 or more falls in past year?: no  Fall with injury in past year?: no     Interventions:    Patient comments: she has not fallen but practices safety tips to avoid increasing risk of fall.             General HRA Questions:  Select all that apply: (!) New or Increased Pain    Pain Interventions:  Patient declined any further interventions or treatment       Weight and Activity:  Physical Activity: Inactive    Days of Exercise per Week: 0 days    Minutes of Reviewed  Radiology: ordered. ECG/medicine tests: ordered. Risk  Prescription drug management. Patient is a 56-year-old female who presents with chest pain and difficulty breathing. Her symptoms are consistent with an asthma exacerbation which is a known issue for her most likely exacerbated by being outside and not having her medications at bedside. She will be treated as such with Decadron and DuoNebs. However because of her age and the fact that she describes as exertional I would like to do a screening EKG and chest x-ray to evaluate for alternative etiologies. We will have a low clinical suspicion for other etiologies. EKG and chest x-ray are unremarkable. Upon reexamination patient feels much better after multiple DuoNebs and some steroids. Refills of her medications to be sent to her pharmacy as well as a repeat dose of dexamethasone to use as needed. Patient is comfortable to plan to be discharged. Patient stable for discharge at this time. Patient is in agreement with the plan to be discharged at this time. All the patient's questions were answered. Patient was given written instructions on the diagnosis, and states understanding of the plan moving forward. We did discuss important signs and symptoms that should prompt quick return to the emergency department. CRITICAL CARE TIME   Total Critical Care time was 0 minutes, excluding separately reportable procedures. There was a high probability of clinically significant/life threatening deterioration in the patient's condition which required my urgent intervention. CONSULTS:  None    PROCEDURES:  Unless otherwise noted below, none     Procedures      FINAL IMPRESSION      1.  Mild intermittent asthma with acute exacerbation          DISPOSITION/PLAN   DISPOSITION Decision To Discharge 11/23/2023 06:10:29 PM      PATIENT REFERRED TO:  Adam Carrasquillo MD  [facility]  [address]  [address] [place] [address]

## 2023-11-23 NOTE — ED NOTES
Pt is feeling much better and feels ready to go home. Discharge instructions reviewed with patient. Patient verbalized understanding. Patient advised to follow up as directed on discharge instructions. Patient denies questions, needs or concerns at this time. Patient verbalized understanding. No s/sx of distress noted.        Florida Bocanegra RN  11/23/23 [id number]

## 2023-11-23 NOTE — ED TRIAGE NOTES
Patient presents to ER c/o Asthma exacerbation. Patient states she ran out her inhaler and medication for nebulizer.

## 2023-11-25 LAB
EKG ATRIAL RATE: 112 BPM
EKG DIAGNOSIS: NORMAL
EKG P AXIS: 70 DEGREES
EKG P-R INTERVAL: 152 MS
EKG Q-T INTERVAL: 358 MS
EKG QRS DURATION: 74 MS
EKG QTC CALCULATION (BAZETT): 488 MS
EKG R AXIS: -20 DEGREES
EKG T AXIS: 57 DEGREES
EKG VENTRICULAR RATE: 112 BPM

## 2023-11-25 PROCEDURE — 93010 ELECTROCARDIOGRAM REPORT: CPT | Performed by: INTERNAL MEDICINE

## 2024-01-06 ENCOUNTER — HOSPITAL ENCOUNTER (EMERGENCY)
Facility: HOSPITAL | Age: 59
Discharge: HOME | End: 2024-01-06
Attending: EMERGENCY MEDICINE
Payer: MEDICAID

## 2024-01-06 VITALS
SYSTOLIC BLOOD PRESSURE: 132 MMHG | TEMPERATURE: 98.1 F | HEART RATE: 72 BPM | RESPIRATION RATE: 16 BRPM | DIASTOLIC BLOOD PRESSURE: 87 MMHG | OXYGEN SATURATION: 100 %

## 2024-01-06 DIAGNOSIS — H01.00A BLEPHARITIS OF UPPER AND LOWER EYELIDS OF BOTH EYES, UNSPECIFIED TYPE: Primary | ICD-10-CM

## 2024-01-06 DIAGNOSIS — H01.00B BLEPHARITIS OF UPPER AND LOWER EYELIDS OF BOTH EYES, UNSPECIFIED TYPE: Primary | ICD-10-CM

## 2024-01-06 PROCEDURE — 99283 EMERGENCY DEPT VISIT LOW MDM: CPT

## 2024-01-06 RX ORDER — ERYTHROMYCIN 5 MG/G
1 OINTMENT OPHTHALMIC EVERY 6 HOURS
Qty: 3.5 G | Refills: 0 | Status: SHIPPED | OUTPATIENT
Start: 2024-01-06 | End: 2024-01-13

## 2024-01-06 ASSESSMENT — PAIN SCALES - GENERAL: PAINLEVEL_OUTOF10: 7

## 2024-01-06 ASSESSMENT — PAIN - FUNCTIONAL ASSESSMENT: PAIN_FUNCTIONAL_ASSESSMENT: 0-10

## 2024-01-06 NOTE — ED PROVIDER NOTES
[facility] EMERGENCY DEPT  EMERGENCY DEPARTMENT ENCOUNTER    Patient Name: Oumou Martinez  MRN: [id number]  YOB: 1965  Provider: Saturnino Mary DO  PCP: Domingo Johnson Sr., MD   Time/Date of evaluation: 8:46 AM EST on 1/6/24    History of Presenting Illness     History Provided by: Patient  History is limited by: Nothing     HISTORY:   Oumou Martinez is a 58 y.o. female presents with a 2-year history of bilateral upper and lower eyelid pain and swelling.  Patient states that she has been applying triamcinolone ointment to her face for the past 2 years.  She states that the ointment continuously gets into her eyes and has been causing irritation, pain, and swelling.  She states that the irritation occasionally causes excoriations and bleeding along the lateral epicanthal folds.  She states that she went to her pharmacist who advised her to come to the emergency room for evaluation.  She denies any discharge, vision changes, fever, chills, ear pain, chest pain, shortness of breath, cough, abdominal pain, nausea, vomiting, diarrhea, back pain, or flank pain.  Patient reports a chronic eczematous rash on bilateral hands.    Nursing Notes were all reviewed and agreed with or any disagreements were addressed in the HPI.    Past History     PAST MEDICAL HISTORY:  Past Medical History:   Diagnosis Date    Arthritis     Asthma     Bilateral leg pain     Carpal tunnel syndrome, right     symptoms    Cellulitis of face     Cocaine dependence with other cocaine-induced disorder (HCC)     DDD (degenerative disc disease), lumbar     mild    Eczema     Hypokalemia     Infectious disease     sphills     Lower back pain     Migraines     Myalgia     lumbar    Numbness and tingling in right hand     Pinched nerve     Sciatica     right lower extremity    Trichomonal vaginitis     Urticaria     UTI (urinary tract infection)     Vasovagal syncope        PAST SURGICAL HISTORY:  Past Surgical History:   Procedure Laterality      Passive Exposure: Not on file   Alcohol Use: Not At Risk (8/21/2023)    AUDIT-C     Frequency of Alcohol Consumption: 2-4 times a month     Average Number of Drinks: 1 or 2     Frequency of Binge Drinking: Never   Financial Resource Strain: Not on file   Food Insecurity: Not on file   Transportation Needs: Not on file   Physical Activity: Not on file   Stress: Not on file   Social Connections: Not on file   Intimate Partner Violence: Not on file   Depression: Not on file   Housing Stability: Not on file   Interpersonal Safety: Not on file   Utilities: Not on file       Review of Systems     Negative except as listed above in HPI.    Physical Exam     Vitals:    01/06/24 0842   BP: 132/87   Pulse: 72   Resp: 16   Temp: 98.1 °F (36.7 °C)   SpO2: 100%       Constitutional: Well nourished, well developed, appears stated age. Alert and oriented.  HEENT: Normal cephalic/atraumatic.  neck supple without meningismus. PERRLA, no conjunctival injection. EOM intact, sclera anicteric.  Chronic swelling with loss of lower eyelashes, bilateral eyes.   Pharynx clear without exudates.  Cardiovascular: RRR, S1/S2, no murmurs, rubs, or gallops.  Warm, well-perfused extremities  Respiratory: Clear to auscultation bilaterally, no wheezing, rales, or rhonchi.  Unlabored respiratory effort  Abdominal: Soft, nontender, nondistended, no rebound, no guarding, no rigidity.  No CVA tenderness  Musculoskeletal: Full range of motion all extremities. No deformities. No peripheral edema.  Skin: Warm, dry. No rashes  Vascular: Pulses equal in all 4 extremities.  Neuro: CNs II-XII grossly intact. Sensation and motor function intact.  No focal neurological deficits.  Psych: Appropriate mood and affect.    Diagnostic Study Results     LABS:  No results found for this or any previous visit (from the past 12 hour(s)).    RADIOLOGIC STUDIES:   Non x-ray images such as CT, Ultrasound and MRI are read by the radiologist. X-ray images are visualized

## 2024-01-06 NOTE — ED TRIAGE NOTES
C/o bilateral eye pain/pink eye x 2 years. She states she uses eczema cream that gets in hers eyes and her pharmacist told her it has caused an infection.

## 2024-01-25 ENCOUNTER — HOSPITAL ENCOUNTER (EMERGENCY)
Facility: HOSPITAL | Age: 59
Discharge: HOME | End: 2024-01-25
Attending: STUDENT IN AN ORGANIZED HEALTH CARE EDUCATION/TRAINING PROGRAM
Payer: MEDICAID

## 2024-01-25 VITALS
HEART RATE: 109 BPM | HEIGHT: 65 IN | OXYGEN SATURATION: 100 % | WEIGHT: 155.7 LBS | TEMPERATURE: 97.6 F | BODY MASS INDEX: 25.94 KG/M2 | DIASTOLIC BLOOD PRESSURE: 61 MMHG | RESPIRATION RATE: 24 BRPM | SYSTOLIC BLOOD PRESSURE: 100 MMHG

## 2024-01-25 DIAGNOSIS — J45.901 MODERATE ASTHMA WITH EXACERBATION, UNSPECIFIED WHETHER PERSISTENT: Primary | ICD-10-CM

## 2024-01-25 PROCEDURE — 99283 EMERGENCY DEPT VISIT LOW MDM: CPT

## 2024-01-25 PROCEDURE — 6370000000 HC RX 637 (ALT 250 FOR IP): Performed by: STUDENT IN AN ORGANIZED HEALTH CARE EDUCATION/TRAINING PROGRAM

## 2024-01-25 RX ORDER — ALBUTEROL SULFATE 90 UG/1
2 AEROSOL, METERED RESPIRATORY (INHALATION) EVERY 6 HOURS PRN
Qty: 18 G | Refills: 3 | Status: SHIPPED | OUTPATIENT
Start: 2024-01-25

## 2024-01-25 RX ORDER — IPRATROPIUM BROMIDE AND ALBUTEROL SULFATE 2.5; .5 MG/3ML; MG/3ML
1 SOLUTION RESPIRATORY (INHALATION)
Status: COMPLETED | OUTPATIENT
Start: 2024-01-25 | End: 2024-01-25

## 2024-01-25 RX ORDER — PREDNISONE 20 MG/1
50 TABLET ORAL
Status: COMPLETED | OUTPATIENT
Start: 2024-01-25 | End: 2024-01-25

## 2024-01-25 RX ORDER — PREDNISONE 50 MG/1
50 TABLET ORAL DAILY
Qty: 5 TABLET | Refills: 0 | Status: SHIPPED | OUTPATIENT
Start: 2024-01-25 | End: 2024-01-30

## 2024-01-25 RX ORDER — ALBUTEROL SULFATE 2.5 MG/3ML
2.5 SOLUTION RESPIRATORY (INHALATION)
Status: DISCONTINUED | OUTPATIENT
Start: 2024-01-25 | End: 2024-01-25 | Stop reason: HOSPADM

## 2024-01-25 RX ADMIN — IPRATROPIUM BROMIDE AND ALBUTEROL SULFATE 1 DOSE: .5; 3 SOLUTION RESPIRATORY (INHALATION) at 05:34

## 2024-01-25 RX ADMIN — IPRATROPIUM BROMIDE AND ALBUTEROL SULFATE 1 DOSE: .5; 3 SOLUTION RESPIRATORY (INHALATION) at 05:23

## 2024-01-25 RX ADMIN — PREDNISONE 50 MG: 20 TABLET ORAL at 05:31

## 2024-01-25 RX ADMIN — IPRATROPIUM BROMIDE AND ALBUTEROL SULFATE 1 DOSE: .5; 3 SOLUTION RESPIRATORY (INHALATION) at 06:08

## 2024-01-25 NOTE — ED NOTES
After second duoneb, patients tated she is breathing better. Lung sounds showed slight wheezes. Informed MD.

## 2024-01-25 NOTE — ED NOTES
Patient discharged at this time. This RN reviewed discharge instructions at this time with the patient.  patient verbalized understanding and does not have any questions. Pt ambulatory upon discharge, & in stable condition. Pt armband removed & shredded.

## 2024-01-25 NOTE — DISCHARGE INSTRUCTIONS
You were evaluated in the Emergency Department today for shortness of breath. Your symptoms improved with Albuterol and steroids, and your evaluation did not show evidence of medical conditions requiring emergent intervention at this time. You have been given a prescription for steroids, please take them as directed.    Please follow up with your primary care physician within two days.    Return to the Emergency Department if you experience worsening shortness of breath, chest pain, headache, light headedness, or any other concerning symptoms.

## 2024-01-25 NOTE — ED TRIAGE NOTES
Aox4 with a cc of shortness of breath, states her asthma is flaring up. Audible wheezing. States she is having some back pain. SOB started 3 days ago, does not have an inhaler, is currently out. Symptoms progressively getting worse.

## 2024-01-25 NOTE — ED PROVIDER NOTES
Rhythm: Normal rate and regular rhythm.   Pulmonary:      Effort: Tachypnea present. No accessory muscle usage.      Breath sounds: Examination of the right-upper field reveals wheezing. Examination of the left-upper field reveals wheezing. Examination of the right-middle field reveals wheezing. Examination of the left-middle field reveals wheezing. Examination of the right-lower field reveals wheezing. Examination of the left-lower field reveals wheezing. Wheezing present.   Musculoskeletal:      Right lower leg: No edema.      Left lower leg: No edema.   Skin:     General: Skin is warm.      Capillary Refill: Capillary refill takes less than 2 seconds.   Neurological:      General: No focal deficit present.      Mental Status: She is alert.            DIAGNOSTIC RESULTS   LABS:  None    EKG: None     RADIOLOGY:  None       PROCEDURES   Unless otherwise noted below, none  Procedures       CRITICAL CARE TIME   None    SCREENINGS   NIH Stroke Score       Heart Score       Curb-65          EMERGENCY DEPARTMENT COURSE and DIFFERENTIAL DIAGNOSIS/MDM   Vitals:    Vitals:    01/25/24 0517 01/25/24 0530 01/25/24 0545 01/25/24 0600   BP: 135/74 116/72 107/64 117/69   Pulse: (!) 124 (!) 112 (!) 109 (!) 109   Resp: 24      Temp: 97.6 °F (36.4 °C)      TempSrc: Oral      SpO2: 100% 98% 100% 99%   Weight: 70.6 kg (155 lb 11.2 oz)      Height: 1.651 m (5' 5\")               Patient was given the following medications:  Medications   albuterol (PROVENTIL) (2.5 MG/3ML) 0.083% nebulizer solution 2.5 mg (2.5 mg Nebulization Not Given 1/25/24 0524)   ipratropium 0.5 mg-albuterol 2.5 mg (DUONEB) nebulizer solution 1 Dose (1 Dose Inhalation Given 1/25/24 0534)   ipratropium 0.5 mg-albuterol 2.5 mg (DUONEB) nebulizer solution 1 Dose (1 Dose Inhalation Given 1/25/24 0523)   predniSONE (DELTASONE) tablet 50 mg (50 mg Oral Given 1/25/24 0531)   ipratropium 0.5 mg-albuterol 2.5 mg (DUONEB) nebulizer solution 1 Dose (1 Dose Inhalation Given

## 2024-05-11 ENCOUNTER — APPOINTMENT (OUTPATIENT)
Facility: HOSPITAL | Age: 59
End: 2024-05-11
Payer: MEDICAID

## 2024-05-11 ENCOUNTER — HOSPITAL ENCOUNTER (EMERGENCY)
Facility: HOSPITAL | Age: 59
Discharge: HOME | End: 2024-05-11
Attending: EMERGENCY MEDICINE
Payer: MEDICAID

## 2024-05-11 VITALS
DIASTOLIC BLOOD PRESSURE: 84 MMHG | TEMPERATURE: 97.9 F | BODY MASS INDEX: 25.13 KG/M2 | WEIGHT: 151 LBS | HEART RATE: 100 BPM | SYSTOLIC BLOOD PRESSURE: 135 MMHG | OXYGEN SATURATION: 99 % | RESPIRATION RATE: 18 BRPM

## 2024-05-11 DIAGNOSIS — R07.81 RIB PAIN ON RIGHT SIDE: Primary | ICD-10-CM

## 2024-05-11 PROCEDURE — 6370000000 HC RX 637 (ALT 250 FOR IP): Performed by: EMERGENCY MEDICINE

## 2024-05-11 PROCEDURE — 71101 X-RAY EXAM UNILAT RIBS/CHEST: CPT

## 2024-05-11 PROCEDURE — 99283 EMERGENCY DEPT VISIT LOW MDM: CPT

## 2024-05-11 RX ORDER — OXYCODONE HYDROCHLORIDE AND ACETAMINOPHEN 5; 325 MG/1; MG/1
1 TABLET ORAL EVERY 6 HOURS PRN
Qty: 12 TABLET | Refills: 0 | Status: SHIPPED | OUTPATIENT
Start: 2024-05-11 | End: 2024-05-14

## 2024-05-11 RX ORDER — LIDOCAINE 4 G/G
1 PATCH TOPICAL
Status: DISCONTINUED | OUTPATIENT
Start: 2024-05-11 | End: 2024-05-11 | Stop reason: HOSPADM

## 2024-05-11 RX ORDER — LIDOCAINE 4 G/G
1 PATCH TOPICAL DAILY
Qty: 30 PATCH | Refills: 0 | Status: SHIPPED | OUTPATIENT
Start: 2024-05-11 | End: 2024-06-10

## 2024-05-11 RX ORDER — OXYCODONE HYDROCHLORIDE AND ACETAMINOPHEN 5; 325 MG/1; MG/1
1 TABLET ORAL
Status: COMPLETED | OUTPATIENT
Start: 2024-05-11 | End: 2024-05-11

## 2024-05-11 RX ADMIN — OXYCODONE HYDROCHLORIDE AND ACETAMINOPHEN 1 TABLET: 5; 325 TABLET ORAL at 05:46

## 2024-05-11 ASSESSMENT — LIFESTYLE VARIABLES
HOW OFTEN DO YOU HAVE A DRINK CONTAINING ALCOHOL: NEVER
HOW MANY STANDARD DRINKS CONTAINING ALCOHOL DO YOU HAVE ON A TYPICAL DAY: PATIENT DOES NOT DRINK

## 2024-05-11 ASSESSMENT — PAIN DESCRIPTION - LOCATION: LOCATION: RIB CAGE

## 2024-05-11 ASSESSMENT — PAIN DESCRIPTION - ORIENTATION: ORIENTATION: RIGHT

## 2024-05-11 ASSESSMENT — ENCOUNTER SYMPTOMS
RESPIRATORY NEGATIVE: 1
GASTROINTESTINAL NEGATIVE: 1

## 2024-05-11 ASSESSMENT — PAIN DESCRIPTION - FREQUENCY: FREQUENCY: CONTINUOUS

## 2024-05-11 ASSESSMENT — PAIN SCALES - GENERAL: PAINLEVEL_OUTOF10: 8

## 2024-05-11 ASSESSMENT — PAIN - FUNCTIONAL ASSESSMENT: PAIN_FUNCTIONAL_ASSESSMENT: 0-10

## 2024-05-11 ASSESSMENT — PAIN DESCRIPTION - PAIN TYPE: TYPE: ACUTE PAIN

## 2024-05-11 ASSESSMENT — PAIN DESCRIPTION - DESCRIPTORS: DESCRIPTORS: ACHING

## 2024-05-11 NOTE — ED PROVIDER NOTES
`[facility] EMERGENCY DEPT  eMERGENCY dEPARTMENT eNCOUnter      Pt Name: Oumou Martinez  MRN: [id number]  Birthdate 1965 of evaluation: 5/11/2024  Provider:Gerald Valdez MD    CHIEF COMPLAINT       HPI    Oumou Martinez is a 58 y.o. female  c/o having right side rib pain that after coughing a lot.  She states her right latera ribs hurt with breathing.    ROS    Review of Systems   HENT: Negative.     Respiratory: Negative.     Cardiovascular: Negative.    Gastrointestinal: Negative.    Musculoskeletal:  Positive for myalgias (right side ribs).   Neurological: Negative.    Psychiatric/Behavioral: Negative.     All other systems reviewed and are negative.    Except as noted above the remainder of the review of systems was reviewed and negative.       PAST MEDICAL HISTORY     Past Medical History:   Diagnosis Date    Arthritis     Asthma     Bilateral leg pain     Carpal tunnel syndrome, right     symptoms    Cellulitis of face     Cocaine dependence with other cocaine-induced disorder (HCC)     DDD (degenerative disc disease), lumbar     mild    Eczema     Hypokalemia     Infectious disease     sphills     Lower back pain     Migraines     Myalgia     lumbar    Numbness and tingling in right hand     Pinched nerve     Sciatica     right lower extremity    Trichomonal vaginitis     Urticaria     UTI (urinary tract infection)     Vasovagal syncope          SURGICAL HISTORY       Past Surgical History:   Procedure Laterality Date    PARTIAL HYSTERECTOMY (CERVIX NOT REMOVED)      TONSILLECTOMY      TUBAL LIGATION           CURRENTMEDICATIONS       Previous Medications    ALBUTEROL SULFATE HFA (PROVENTIL;VENTOLIN;PROAIR) 108 (90 BASE) MCG/ACT INHALER    Inhale 2 puffs into the lungs every 6 hours as needed for Wheezing or Shortness of Breath    ALBUTEROL SULFATE HFA (PROVENTIL;VENTOLIN;PROAIR) 108 (90 BASE) MCG/ACT INHALER    Inhale 2 puffs into the lungs every 6 hours as needed for Wheezing    DICLOFENAC (VOLTAREN) 75 MG  68.5 kg (151 lb)       MDM  Risk of Complications, Morbidity, and/or Mortality  Presenting problems: moderate  Diagnostic procedures: moderate  Management options: moderate        XR RIBS RIGHT INCLUDE CHEST (MIN 3 VIEWS)    (Results Pending)     (+) old healed rib fx 5 on right side of chest, (+) DJD changes      5:32 AM  Upon re-evaluation the patient's symptoms have improved. Pt has non-toxic appearance and condition is stable for discharge. Patient was informed of tests & results, instructed to f/u with PCP and return to the ED upon worsening of symptoms. All questions and concerns were addressed.       Procedures    FINAL IMPRESSION      Muscular skeletal pain    DISPOSITION/PLAN     DISPOSITION    D/C home    DISCHARGE MEDICATIONS:     Motrin, percocet      PATIENT REFERRED TO:  PCP in 3 to 4 days.  Return to ER prn.      (Please note that portions of this note were completed with a voice recognitionprogram.  Efforts were made to edit the dictations but occasionally words are mis-transcribed.)    Gerald Valdez MD(electronically signed)  Attending Emergency Physician          Gerald Valdez MD  05/12/24 9847

## 2024-05-11 NOTE — ED NOTES
Discharge teaching provided to pt regarding treatment received, medications prescribed, and follow-up care. Pt verbalized understanding directions and follow up care. Pt left ambulatory with discharge paperwork in hand. Pt has ride home with .

## 2024-05-11 NOTE — ED TRIAGE NOTES
Patient A/O x 4, presented to the ED with complaint of right sided rib pain x 3 days, denies trauma.

## 2024-05-11 NOTE — ED NOTES
Pt A0X4, responds to commands, no SOB noted upon assessment, 100% on room air. Pt guarding right side of ribs due to pain from coughing. Call bell within reach, will continue to monitor.

## 2024-08-11 ENCOUNTER — HOSPITAL ENCOUNTER (EMERGENCY)
Facility: HOSPITAL | Age: 59
Discharge: HOME | End: 2024-08-11
Attending: EMERGENCY MEDICINE
Payer: MEDICAID

## 2024-08-11 ENCOUNTER — APPOINTMENT (OUTPATIENT)
Facility: HOSPITAL | Age: 59
End: 2024-08-11
Payer: MEDICAID

## 2024-08-11 VITALS
SYSTOLIC BLOOD PRESSURE: 117 MMHG | DIASTOLIC BLOOD PRESSURE: 77 MMHG | OXYGEN SATURATION: 94 % | HEART RATE: 99 BPM | WEIGHT: 142 LBS | HEIGHT: 65 IN | BODY MASS INDEX: 23.66 KG/M2 | RESPIRATION RATE: 21 BRPM | TEMPERATURE: 97.5 F

## 2024-08-11 DIAGNOSIS — J45.21 MILD INTERMITTENT ASTHMA WITH ACUTE EXACERBATION: ICD-10-CM

## 2024-08-11 DIAGNOSIS — J45.901 EXACERBATION OF ASTHMA, UNSPECIFIED ASTHMA SEVERITY, UNSPECIFIED WHETHER PERSISTENT: Primary | ICD-10-CM

## 2024-08-11 LAB
ALBUMIN SERPL-MCNC: 3.8 G/DL (ref 3.4–5)
ALBUMIN/GLOB SERPL: 1 (ref 0.8–1.7)
ALP SERPL-CCNC: 103 U/L (ref 45–117)
ALT SERPL-CCNC: 33 U/L (ref 13–56)
ANION GAP SERPL CALC-SCNC: 9 MMOL/L (ref 3–18)
AST SERPL-CCNC: 28 U/L (ref 10–38)
BASOPHILS # BLD: 0.1 K/UL (ref 0–0.1)
BASOPHILS NFR BLD: 0 % (ref 0–2)
BILIRUB SERPL-MCNC: 0.6 MG/DL (ref 0.2–1)
BUN SERPL-MCNC: 18 MG/DL (ref 7–18)
BUN/CREAT SERPL: 21 (ref 12–20)
CALCIUM SERPL-MCNC: 9.6 MG/DL (ref 8.5–10.1)
CHLORIDE SERPL-SCNC: 107 MMOL/L (ref 100–111)
CO2 SERPL-SCNC: 24 MMOL/L (ref 21–32)
CREAT SERPL-MCNC: 0.86 MG/DL (ref 0.6–1.3)
DIFFERENTIAL METHOD BLD: ABNORMAL
EOSINOPHIL # BLD: 0.3 K/UL (ref 0–0.4)
EOSINOPHIL NFR BLD: 3 % (ref 0–5)
ERYTHROCYTE [DISTWIDTH] IN BLOOD BY AUTOMATED COUNT: 12 % (ref 11.6–14.5)
FLUAV AG NPH QL IA: NEGATIVE
FLUBV AG NOSE QL IA: NEGATIVE
GLOBULIN SER CALC-MCNC: 4 G/DL (ref 2–4)
GLUCOSE SERPL-MCNC: 110 MG/DL (ref 74–99)
HCT VFR BLD AUTO: 42.2 % (ref 35–45)
HGB BLD-MCNC: 13.9 G/DL (ref 12–16)
IMM GRANULOCYTES # BLD AUTO: 0 K/UL (ref 0–0.04)
IMM GRANULOCYTES NFR BLD AUTO: 0 % (ref 0–0.5)
LYMPHOCYTES # BLD: 2.6 K/UL (ref 0.9–3.6)
LYMPHOCYTES NFR BLD: 22 % (ref 21–52)
MCH RBC QN AUTO: 29.9 PG (ref 24–34)
MCHC RBC AUTO-ENTMCNC: 32.9 G/DL (ref 31–37)
MCV RBC AUTO: 90.8 FL (ref 78–100)
MONOCYTES # BLD: 0.6 K/UL (ref 0.05–1.2)
MONOCYTES NFR BLD: 5 % (ref 3–10)
NEUTS SEG # BLD: 8.2 K/UL (ref 1.8–8)
NEUTS SEG NFR BLD: 70 % (ref 40–73)
NRBC # BLD: 0 K/UL (ref 0–0.01)
NRBC BLD-RTO: 0 PER 100 WBC
NT PRO BNP: 1319 PG/ML (ref 0–900)
PLATELET # BLD AUTO: 189 K/UL (ref 135–420)
PMV BLD AUTO: 11.1 FL (ref 9.2–11.8)
POTASSIUM SERPL-SCNC: 3.9 MMOL/L (ref 3.5–5.5)
PROT SERPL-MCNC: 7.8 G/DL (ref 6.4–8.2)
RBC # BLD AUTO: 4.65 M/UL (ref 4.2–5.3)
SARS-COV-2 RDRP RESP QL NAA+PROBE: NOT DETECTED
SODIUM SERPL-SCNC: 140 MMOL/L (ref 136–145)
SOURCE: NORMAL
TROPONIN I SERPL HS-MCNC: 13 NG/L (ref 0–54)
WBC # BLD AUTO: 11.7 K/UL (ref 4.6–13.2)

## 2024-08-11 PROCEDURE — 71045 X-RAY EXAM CHEST 1 VIEW: CPT

## 2024-08-11 PROCEDURE — 96375 TX/PRO/DX INJ NEW DRUG ADDON: CPT

## 2024-08-11 PROCEDURE — 87635 SARS-COV-2 COVID-19 AMP PRB: CPT

## 2024-08-11 PROCEDURE — 99285 EMERGENCY DEPT VISIT HI MDM: CPT

## 2024-08-11 PROCEDURE — 96365 THER/PROPH/DIAG IV INF INIT: CPT

## 2024-08-11 PROCEDURE — 93005 ELECTROCARDIOGRAM TRACING: CPT | Performed by: EMERGENCY MEDICINE

## 2024-08-11 PROCEDURE — 80053 COMPREHEN METABOLIC PANEL: CPT

## 2024-08-11 PROCEDURE — 84484 ASSAY OF TROPONIN QUANT: CPT

## 2024-08-11 PROCEDURE — 6370000000 HC RX 637 (ALT 250 FOR IP): Performed by: EMERGENCY MEDICINE

## 2024-08-11 PROCEDURE — 87804 INFLUENZA ASSAY W/OPTIC: CPT

## 2024-08-11 PROCEDURE — 6360000002 HC RX W HCPCS: Performed by: EMERGENCY MEDICINE

## 2024-08-11 PROCEDURE — 85025 COMPLETE CBC W/AUTO DIFF WBC: CPT

## 2024-08-11 PROCEDURE — 6370000000 HC RX 637 (ALT 250 FOR IP)

## 2024-08-11 PROCEDURE — 83880 ASSAY OF NATRIURETIC PEPTIDE: CPT

## 2024-08-11 PROCEDURE — 2580000003 HC RX 258: Performed by: EMERGENCY MEDICINE

## 2024-08-11 RX ORDER — ALBUTEROL SULFATE 90 UG/1
2 AEROSOL, METERED RESPIRATORY (INHALATION) EVERY 6 HOURS PRN
Qty: 18 G | Refills: 2 | Status: SHIPPED | OUTPATIENT
Start: 2024-08-11

## 2024-08-11 RX ORDER — IPRATROPIUM BROMIDE AND ALBUTEROL SULFATE 2.5; .5 MG/3ML; MG/3ML
1 SOLUTION RESPIRATORY (INHALATION)
Status: COMPLETED | OUTPATIENT
Start: 2024-08-11 | End: 2024-08-11

## 2024-08-11 RX ORDER — MAGNESIUM SULFATE IN WATER 40 MG/ML
2000 INJECTION, SOLUTION INTRAVENOUS ONCE
Status: COMPLETED | OUTPATIENT
Start: 2024-08-11 | End: 2024-08-11

## 2024-08-11 RX ORDER — IPRATROPIUM BROMIDE AND ALBUTEROL SULFATE 2.5; .5 MG/3ML; MG/3ML
SOLUTION RESPIRATORY (INHALATION)
Status: COMPLETED
Start: 2024-08-11 | End: 2024-08-11

## 2024-08-11 RX ORDER — ALBUTEROL SULFATE 2.5 MG/3ML
2.5 SOLUTION RESPIRATORY (INHALATION) EVERY 6 HOURS PRN
Qty: 120 EACH | Refills: 3 | Status: SHIPPED | OUTPATIENT
Start: 2024-08-11

## 2024-08-11 RX ADMIN — IPRATROPIUM BROMIDE AND ALBUTEROL SULFATE 1 DOSE: .5; 3 SOLUTION RESPIRATORY (INHALATION) at 10:03

## 2024-08-11 RX ADMIN — WATER 125 MG: 1 INJECTION INTRAMUSCULAR; INTRAVENOUS; SUBCUTANEOUS at 08:08

## 2024-08-11 RX ADMIN — MAGNESIUM SULFATE HEPTAHYDRATE 2000 MG: 40 INJECTION, SOLUTION INTRAVENOUS at 08:10

## 2024-08-11 RX ADMIN — IPRATROPIUM BROMIDE AND ALBUTEROL SULFATE 1 DOSE: 2.5; .5 SOLUTION RESPIRATORY (INHALATION) at 06:50

## 2024-08-11 RX ADMIN — IPRATROPIUM BROMIDE AND ALBUTEROL SULFATE 1 DOSE: .5; 3 SOLUTION RESPIRATORY (INHALATION) at 06:50

## 2024-08-11 ASSESSMENT — PAIN - FUNCTIONAL ASSESSMENT: PAIN_FUNCTIONAL_ASSESSMENT: NONE - DENIES PAIN

## 2024-08-11 NOTE — ED TRIAGE NOTES
Pt to ED reports SOB x 3 days worse this am, pt unable to talk in full complete sentences, pt has cough hx of asthma took breathing tx with no  relief

## 2024-08-11 NOTE — DISCHARGE INSTRUCTIONS
Follow-up with your doctor within 24-48 hours.  Take the inhaler and steroids as prescribed  Stay well hydrated.  Return to the ER for any new or worsening symptoms or concerns including difficulty breathing, fevers, chest pain, feeling faint, confusion, wheezing/shortness breath not improved with inhaler,  worsening cough, or any concerns.      A copy of all your test results have been given to you.  Please follow up with the doctor in 1-2 days to review all the results to add to your record and to pursue any abnormal findings.

## 2024-08-11 NOTE — ED NOTES
Started an IV, sent blood work  EKG done  Connected pt to monitor  Changed pt into gown  Sent covid swab to lab

## 2024-08-11 NOTE — ED NOTES
Pt lying on stretcher in semi-Celis's position, eyes closed. Pt is easily aroused per verbal stimulation. Pt updated regarding plan of care. She is agreeable; medications administered per orders/MAR. VS as noted per cardiac monitor. Bed in lowest position, SR up x 2 for safety, call bell within easy reach. Instructed pt to call for assistance as needed. She verbalized understanding. Lights dimmed for comfort.

## 2024-08-11 NOTE — ED PROVIDER NOTES
discussed)  None    Chronic Conditions:   Past Medical History:   Diagnosis Date    Arthritis     Asthma     Bilateral leg pain     Carpal tunnel syndrome, right     symptoms    Cellulitis of face     Cocaine dependence with other cocaine-induced disorder (HCC)     DDD (degenerative disc disease), lumbar     mild    Eczema     Hypokalemia     Infectious disease     sphills     Lower back pain     Migraines     Myalgia     lumbar    Numbness and tingling in right hand     Pinched nerve     Sciatica     right lower extremity    Trichomonal vaginitis     Urticaria     UTI (urinary tract infection)     Vasovagal syncope          Social Determinants affecting Dx or Tx:  no pcp    Records Reviewed (source and summary of external notes): Nursing Notes and Old Medical Records    Procedures    Critical Care Time: 0    SCREENING TOOLS:  None    CLINICAL MANAGEMENT TOOLS:  Not Applicable      Diagnosis     Clinical Impression:   1. Exacerbation of asthma, unspecified asthma severity, unspecified whether persistent    2. Mild intermittent asthma with acute exacerbation         Disposition: home    Current Discharge Medication List        CONTINUE these medications which have CHANGED    Details   albuterol sulfate HFA (PROVENTIL;VENTOLIN;PROAIR) 108 (90 Base) MCG/ACT inhaler Inhale 2 puffs into the lungs every 6 hours as needed for Wheezing or Shortness of Breath  Qty: 18 g, Refills: 2              [facility] [address], [address]  [facility] [address] [phone]  Schedule an appointment as soon as possible for a visit in 1 day      [facility]  [address], [facility] [address]  [facility] [address]  [phone]  Schedule an appointment as soon as possible for a visit in 1 day         Desiree Paniagua DO  Disclaimer: Sections of this note are dictated using utilizing voice recognition software.  Minor typographical errors may be present. If questions arise, please do not

## 2024-08-12 LAB
EKG ATRIAL RATE: 117 BPM
EKG DIAGNOSIS: NORMAL
EKG P AXIS: 63 DEGREES
EKG P-R INTERVAL: 156 MS
EKG Q-T INTERVAL: 360 MS
EKG QRS DURATION: 80 MS
EKG QTC CALCULATION (BAZETT): 502 MS
EKG R AXIS: -37 DEGREES
EKG T AXIS: 67 DEGREES
EKG VENTRICULAR RATE: 117 BPM

## 2024-08-12 PROCEDURE — 93010 ELECTROCARDIOGRAM REPORT: CPT | Performed by: INTERNAL MEDICINE

## 2024-12-31 ENCOUNTER — HOSPITAL ENCOUNTER (EMERGENCY)
Facility: HOSPITAL | Age: 59
Discharge: LEFT BEFORE DISCHARGE | End: 2024-12-31

## 2025-01-01 ENCOUNTER — APPOINTMENT (OUTPATIENT)
Facility: HOSPITAL | Age: 60
End: 2025-01-01
Payer: MEDICAID

## 2025-01-01 ENCOUNTER — HOSPITAL ENCOUNTER (EMERGENCY)
Facility: HOSPITAL | Age: 60
Discharge: HOME | End: 2025-01-01
Attending: EMERGENCY MEDICINE
Payer: MEDICAID

## 2025-01-01 VITALS
HEART RATE: 92 BPM | RESPIRATION RATE: 16 BRPM | SYSTOLIC BLOOD PRESSURE: 112 MMHG | BODY MASS INDEX: 20.83 KG/M2 | OXYGEN SATURATION: 94 % | HEIGHT: 65 IN | DIASTOLIC BLOOD PRESSURE: 78 MMHG | WEIGHT: 125 LBS | TEMPERATURE: 97.8 F

## 2025-01-01 DIAGNOSIS — M87.051 AVASCULAR NECROSIS OF BONE OF RIGHT HIP: ICD-10-CM

## 2025-01-01 DIAGNOSIS — A59.01 TRICHOMONIASIS OF VAGINA: Primary | ICD-10-CM

## 2025-01-01 LAB
ANION GAP SERPL CALC-SCNC: 6 MMOL/L (ref 3–18)
APPEARANCE UR: ABNORMAL
BACTERIA URNS QL MICRO: ABNORMAL /HPF
BASOPHILS # BLD: 0 K/UL (ref 0–0.1)
BASOPHILS NFR BLD: 1 % (ref 0–2)
BILIRUB UR QL: NEGATIVE
BUN SERPL-MCNC: 16 MG/DL (ref 7–18)
BUN/CREAT SERPL: 17 (ref 12–20)
CALCIUM SERPL-MCNC: 9.2 MG/DL (ref 8.5–10.1)
CHLORIDE SERPL-SCNC: 107 MMOL/L (ref 100–111)
CO2 SERPL-SCNC: 27 MMOL/L (ref 21–32)
COLOR UR: YELLOW
CREAT SERPL-MCNC: 0.94 MG/DL (ref 0.6–1.3)
DIFFERENTIAL METHOD BLD: NORMAL
EOSINOPHIL # BLD: 0.3 K/UL (ref 0–0.4)
EOSINOPHIL NFR BLD: 3 % (ref 0–5)
EPITH CASTS URNS QL MICRO: ABNORMAL /LPF (ref 0–5)
ERYTHROCYTE [DISTWIDTH] IN BLOOD BY AUTOMATED COUNT: 11.9 % (ref 11.6–14.5)
GLUCOSE SERPL-MCNC: 101 MG/DL (ref 74–99)
GLUCOSE UR STRIP.AUTO-MCNC: NEGATIVE MG/DL
HCT VFR BLD AUTO: 44.9 % (ref 35–45)
HGB BLD-MCNC: 14.8 G/DL (ref 12–16)
HGB UR QL STRIP: ABNORMAL
IMM GRANULOCYTES # BLD AUTO: 0 K/UL (ref 0–0.04)
IMM GRANULOCYTES NFR BLD AUTO: 0 % (ref 0–0.5)
KETONES UR QL STRIP.AUTO: NEGATIVE MG/DL
LEUKOCYTE ESTERASE UR QL STRIP.AUTO: ABNORMAL
LYMPHOCYTES # BLD: 3.1 K/UL (ref 0.9–3.6)
LYMPHOCYTES NFR BLD: 40 % (ref 21–52)
MCH RBC QN AUTO: 29.8 PG (ref 24–34)
MCHC RBC AUTO-ENTMCNC: 33 G/DL (ref 31–37)
MCV RBC AUTO: 90.5 FL (ref 78–100)
MONOCYTES # BLD: 0.5 K/UL (ref 0.05–1.2)
MONOCYTES NFR BLD: 6 % (ref 3–10)
NEUTS SEG # BLD: 4 K/UL (ref 1.8–8)
NEUTS SEG NFR BLD: 51 % (ref 40–73)
NITRITE UR QL STRIP.AUTO: NEGATIVE
NRBC # BLD: 0 K/UL (ref 0–0.01)
NRBC BLD-RTO: 0 PER 100 WBC
PH UR STRIP: 5 (ref 5–8)
PLATELET # BLD AUTO: 185 K/UL (ref 135–420)
PMV BLD AUTO: 11.2 FL (ref 9.2–11.8)
POTASSIUM SERPL-SCNC: 4.1 MMOL/L (ref 3.5–5.5)
PROT UR STRIP-MCNC: ABNORMAL MG/DL
RBC # BLD AUTO: 4.96 M/UL (ref 4.2–5.3)
RBC #/AREA URNS HPF: ABNORMAL /HPF (ref 0–5)
SERVICE CMNT-IMP: NORMAL
SODIUM SERPL-SCNC: 140 MMOL/L (ref 136–145)
SP GR UR REFRACTOMETRY: 1.02 (ref 1–1.03)
UROBILINOGEN UR QL STRIP.AUTO: 1 EU/DL (ref 0.2–1)
WBC # BLD AUTO: 7.9 K/UL (ref 4.6–13.2)
WBC URNS QL MICRO: ABNORMAL /HPF (ref 0–4)
WET PREP GENITAL: NORMAL

## 2025-01-01 PROCEDURE — 87591 N.GONORRHOEAE DNA AMP PROB: CPT

## 2025-01-01 PROCEDURE — 81001 URINALYSIS AUTO W/SCOPE: CPT

## 2025-01-01 PROCEDURE — 6360000004 HC RX CONTRAST MEDICATION: Performed by: EMERGENCY MEDICINE

## 2025-01-01 PROCEDURE — 87661 TRICHOMONAS VAGINALIS AMPLIF: CPT

## 2025-01-01 PROCEDURE — 85025 COMPLETE CBC W/AUTO DIFF WBC: CPT

## 2025-01-01 PROCEDURE — 80048 BASIC METABOLIC PNL TOTAL CA: CPT

## 2025-01-01 PROCEDURE — 87210 SMEAR WET MOUNT SALINE/INK: CPT

## 2025-01-01 PROCEDURE — 87491 CHLMYD TRACH DNA AMP PROBE: CPT

## 2025-01-01 PROCEDURE — 74177 CT ABD & PELVIS W/CONTRAST: CPT

## 2025-01-01 PROCEDURE — 99285 EMERGENCY DEPT VISIT HI MDM: CPT

## 2025-01-01 PROCEDURE — 6370000000 HC RX 637 (ALT 250 FOR IP): Performed by: EMERGENCY MEDICINE

## 2025-01-01 RX ORDER — METRONIDAZOLE 500 MG/1
500 TABLET ORAL 3 TIMES DAILY
Qty: 21 TABLET | Refills: 0 | Status: SHIPPED | OUTPATIENT
Start: 2025-01-01 | End: 2025-01-08

## 2025-01-01 RX ORDER — OXYCODONE AND ACETAMINOPHEN 5; 325 MG/1; MG/1
1 TABLET ORAL
Status: COMPLETED | OUTPATIENT
Start: 2025-01-01 | End: 2025-01-01

## 2025-01-01 RX ORDER — TRIAMCINOLONE ACETONIDE 0.25 MG/G
OINTMENT TOPICAL
Qty: 1 EACH | Refills: 1 | Status: SHIPPED | OUTPATIENT
Start: 2025-01-01 | End: 2025-01-08

## 2025-01-01 RX ORDER — OXYCODONE AND ACETAMINOPHEN 5; 325 MG/1; MG/1
1 TABLET ORAL EVERY 6 HOURS PRN
Qty: 12 TABLET | Refills: 0 | Status: SHIPPED | OUTPATIENT
Start: 2025-01-01 | End: 2025-01-04

## 2025-01-01 RX ORDER — IOPAMIDOL 612 MG/ML
100 INJECTION, SOLUTION INTRAVASCULAR
Status: COMPLETED | OUTPATIENT
Start: 2025-01-01 | End: 2025-01-01

## 2025-01-01 RX ADMIN — IOPAMIDOL 80 ML: 612 INJECTION, SOLUTION INTRAVENOUS at 17:59

## 2025-01-01 RX ADMIN — OXYCODONE HYDROCHLORIDE AND ACETAMINOPHEN 1 TABLET: 5; 325 TABLET ORAL at 20:06

## 2025-01-01 ASSESSMENT — PAIN DESCRIPTION - LOCATION: LOCATION: HIP

## 2025-01-01 ASSESSMENT — PAIN SCALES - GENERAL: PAINLEVEL_OUTOF10: 9

## 2025-01-01 ASSESSMENT — PAIN - FUNCTIONAL ASSESSMENT: PAIN_FUNCTIONAL_ASSESSMENT: NONE - DENIES PAIN

## 2025-01-01 ASSESSMENT — PAIN DESCRIPTION - ORIENTATION: ORIENTATION: RIGHT

## 2025-01-01 ASSESSMENT — PAIN DESCRIPTION - DESCRIPTORS: DESCRIPTORS: ACHING

## 2025-01-01 NOTE — ED TRIAGE NOTES
Followed up with Oumou Martinez on 12/31/2024 at 10:42 PM. Patient left the ED with a disposition of LWBS before triage on . Patient cited wait time and wants to come back in the morning as reason. Advised patient to follow up with a primary care physician or return to the Emergency Department if symptoms worsen. This nurse and security stopped patient in the parking lot to advised her to come back to be seen and she said she will come back in the morning.    Percy Valdez RN

## 2025-01-01 NOTE — ED PROVIDER NOTES
MEDICATIONS:  Current Discharge Medication List             Details   albuterol sulfate HFA (PROVENTIL;VENTOLIN;PROAIR) 108 (90 Base) MCG/ACT inhaler Inhale 2 puffs into the lungs every 6 hours as needed for Wheezing or Shortness of Breath  Qty: 18 g, Refills: 2      albuterol (PROVENTIL) (2.5 MG/3ML) 0.083% nebulizer solution Take 3 mLs by nebulization every 6 hours as needed for Wheezing  Qty: 120 each, Refills: 3      oxyCODONE-acetaminophen (PERCOCET) 7.5-325 MG per tablet Take 1 tablet by mouth every 12 hours as needed.      diclofenac (VOLTAREN) 75 MG EC tablet Take 1 tablet by mouth 2 times daily             DISCONTINUED MEDICATIONS:  Current Discharge Medication List          PATIENT REFERRED TO:  Follow Up with:  No follow-up provider specified.  _______________________________    Clinical Impression: No diagnosis found.     Nestor Sharp MD using Dragon dictation      _______________________________

## 2025-01-01 NOTE — ED TRIAGE NOTES
Ambulatory to triage with rash on face x 2 days from hair product and also vaginal discharge with odor x 1 week. Pt states \"I am supposed to be having a right hip replacement and the pain has been worse so I am not sure if the vaginal discharge is from that\".

## 2025-01-02 LAB
C TRACH RRNA SPEC QL NAA+PROBE: NEGATIVE
N GONORRHOEA RRNA SPEC QL NAA+PROBE: NEGATIVE
SPECIMEN SOURCE: ABNORMAL
T VAGINALIS RRNA SPEC QL NAA+PROBE: POSITIVE

## 2025-02-13 ENCOUNTER — HOSPITAL ENCOUNTER (EMERGENCY)
Facility: HOSPITAL | Age: 60
Discharge: HOME | End: 2025-02-13
Attending: EMERGENCY MEDICINE
Payer: MEDICAID

## 2025-02-13 ENCOUNTER — APPOINTMENT (OUTPATIENT)
Facility: HOSPITAL | Age: 60
End: 2025-02-13
Payer: MEDICAID

## 2025-02-13 VITALS
SYSTOLIC BLOOD PRESSURE: 110 MMHG | DIASTOLIC BLOOD PRESSURE: 63 MMHG | OXYGEN SATURATION: 93 % | RESPIRATION RATE: 21 BRPM | BODY MASS INDEX: 21.66 KG/M2 | TEMPERATURE: 97.7 F | HEIGHT: 65 IN | HEART RATE: 104 BPM | WEIGHT: 130 LBS

## 2025-02-13 DIAGNOSIS — B96.89 BV (BACTERIAL VAGINOSIS): ICD-10-CM

## 2025-02-13 DIAGNOSIS — N39.0 URINARY TRACT INFECTION WITHOUT HEMATURIA, SITE UNSPECIFIED: ICD-10-CM

## 2025-02-13 DIAGNOSIS — N76.0 BV (BACTERIAL VAGINOSIS): ICD-10-CM

## 2025-02-13 DIAGNOSIS — J45.20 MILD INTERMITTENT ASTHMA WITHOUT COMPLICATION: Primary | ICD-10-CM

## 2025-02-13 LAB
APPEARANCE UR: ABNORMAL
BACTERIA URNS QL MICRO: ABNORMAL /HPF
BILIRUB UR QL: NEGATIVE
C TRACH RRNA SPEC QL NAA+PROBE: NEGATIVE
COLOR UR: YELLOW
EPITH CASTS URNS QL MICRO: ABNORMAL /LPF (ref 0–5)
FLUAV RNA SPEC QL NAA+PROBE: NOT DETECTED
FLUBV RNA SPEC QL NAA+PROBE: NOT DETECTED
GLUCOSE UR STRIP.AUTO-MCNC: NEGATIVE MG/DL
HGB UR QL STRIP: ABNORMAL
KETONES UR QL STRIP.AUTO: NEGATIVE MG/DL
LEUKOCYTE ESTERASE UR QL STRIP.AUTO: ABNORMAL
N GONORRHOEA RRNA SPEC QL NAA+PROBE: NEGATIVE
NITRITE UR QL STRIP.AUTO: NEGATIVE
PH UR STRIP: 5.5 (ref 5–8)
PROT UR STRIP-MCNC: NEGATIVE MG/DL
RBC #/AREA URNS HPF: ABNORMAL /HPF (ref 0–5)
SARS-COV-2 RNA RESP QL NAA+PROBE: NOT DETECTED
SERVICE CMNT-IMP: NORMAL
SOURCE: NORMAL
SP GR UR REFRACTOMETRY: 1.02 (ref 1–1.03)
SPECIMEN SOURCE: NORMAL
T VAGINALIS RRNA SPEC QL NAA+PROBE: NEGATIVE
UROBILINOGEN UR QL STRIP.AUTO: 1 EU/DL (ref 0.2–1)
WBC URNS QL MICRO: ABNORMAL /HPF (ref 0–4)
WET PREP GENITAL: NORMAL

## 2025-02-13 PROCEDURE — 87591 N.GONORRHOEAE DNA AMP PROB: CPT

## 2025-02-13 PROCEDURE — 71045 X-RAY EXAM CHEST 1 VIEW: CPT

## 2025-02-13 PROCEDURE — 87636 SARSCOV2 & INF A&B AMP PRB: CPT

## 2025-02-13 PROCEDURE — 99284 EMERGENCY DEPT VISIT MOD MDM: CPT

## 2025-02-13 PROCEDURE — 87661 TRICHOMONAS VAGINALIS AMPLIF: CPT

## 2025-02-13 PROCEDURE — 87210 SMEAR WET MOUNT SALINE/INK: CPT

## 2025-02-13 PROCEDURE — 6370000000 HC RX 637 (ALT 250 FOR IP): Performed by: EMERGENCY MEDICINE

## 2025-02-13 PROCEDURE — 81001 URINALYSIS AUTO W/SCOPE: CPT

## 2025-02-13 PROCEDURE — 6360000002 HC RX W HCPCS: Performed by: EMERGENCY MEDICINE

## 2025-02-13 PROCEDURE — 87491 CHLMYD TRACH DNA AMP PROBE: CPT

## 2025-02-13 RX ORDER — IPRATROPIUM BROMIDE AND ALBUTEROL SULFATE 2.5; .5 MG/3ML; MG/3ML
1 SOLUTION RESPIRATORY (INHALATION)
Status: COMPLETED | OUTPATIENT
Start: 2025-02-13 | End: 2025-02-13

## 2025-02-13 RX ORDER — PREDNISONE 5 MG/1
5 TABLET ORAL
Status: DISCONTINUED | OUTPATIENT
Start: 2025-02-13 | End: 2025-02-13

## 2025-02-13 RX ORDER — ALBUTEROL SULFATE 0.83 MG/ML
2.5 SOLUTION RESPIRATORY (INHALATION)
Status: COMPLETED | OUTPATIENT
Start: 2025-02-13 | End: 2025-02-13

## 2025-02-13 RX ORDER — ALBUTEROL SULFATE 90 UG/1
2 INHALANT RESPIRATORY (INHALATION) EVERY 6 HOURS PRN
Qty: 18 G | Refills: 3 | Status: SHIPPED | OUTPATIENT
Start: 2025-02-13

## 2025-02-13 RX ORDER — PREDNISONE 20 MG/1
40 TABLET ORAL
Status: COMPLETED | OUTPATIENT
Start: 2025-02-13 | End: 2025-02-13

## 2025-02-13 RX ORDER — SULFAMETHOXAZOLE AND TRIMETHOPRIM 800; 160 MG/1; MG/1
1 TABLET ORAL 2 TIMES DAILY
Qty: 14 TABLET | Refills: 0 | Status: SHIPPED | OUTPATIENT
Start: 2025-02-13 | End: 2025-02-20

## 2025-02-13 RX ORDER — METRONIDAZOLE 500 MG/1
500 TABLET ORAL 2 TIMES DAILY
Qty: 14 TABLET | Refills: 0 | Status: SHIPPED | OUTPATIENT
Start: 2025-02-13 | End: 2025-02-20

## 2025-02-13 RX ORDER — PREDNISONE 50 MG/1
50 TABLET ORAL DAILY
Qty: 4 TABLET | Refills: 0 | Status: SHIPPED | OUTPATIENT
Start: 2025-02-13 | End: 2025-02-17

## 2025-02-13 RX ADMIN — IPRATROPIUM BROMIDE AND ALBUTEROL SULFATE 1 DOSE: .5; 3 SOLUTION RESPIRATORY (INHALATION) at 02:31

## 2025-02-13 RX ADMIN — PREDNISONE 40 MG: 20 TABLET ORAL at 02:58

## 2025-02-13 RX ADMIN — ALBUTEROL SULFATE 2.5 MG: 2.5 SOLUTION RESPIRATORY (INHALATION) at 03:29

## 2025-02-13 ASSESSMENT — PAIN SCALES - GENERAL: PAINLEVEL_OUTOF10: 0

## 2025-02-13 ASSESSMENT — ENCOUNTER SYMPTOMS
GASTROINTESTINAL NEGATIVE: 1
SHORTNESS OF BREATH: 1
WHEEZING: 1

## 2025-02-13 ASSESSMENT — PAIN - FUNCTIONAL ASSESSMENT: PAIN_FUNCTIONAL_ASSESSMENT: 0-10

## 2025-02-13 NOTE — ED TRIAGE NOTES
Aox4 with a cc of shortness of breath that started yesterday. Took her at home medication without relief. Pt would like to get tested for STD as her symptoms for STD did not go away from her 1  mo diagnosis, which was treated with antibiotics.

## 2025-02-13 NOTE — ED PROVIDER NOTES
`[facility] EMERGENCY DEPARTMENT  eMERGENCY dEPARTMENT eNCOUnter      Pt Name: Oumou Martinez  MRN: [id number]  Birthdate 1965 of evaluation: 2/13/2025  Provider:Gerald Valdez MD    CHIEF COMPLAINT         HPI  Oumou Martinez is a 59 y.o. female  c/o having a sudden onset of wheezing symptoms that started today.  Patient also C/O having a vaginal discharge and was treated for a STD a few weeks ago.  States her symptoms never resolved.    ROS    Review of Systems   Constitutional: Negative.    HENT: Negative.     Respiratory:  Positive for shortness of breath and wheezing.    Cardiovascular: Negative.    Gastrointestinal: Negative.    Genitourinary:  Positive for vaginal discharge. Negative for pelvic pain and vaginal pain.   All other systems reviewed and are negative.      Except as noted above the remainder of the review of systems was reviewed and negative.       PAST MEDICAL HISTORY     Past Medical History:   Diagnosis Date    Arthritis     Asthma     Bilateral leg pain     Carpal tunnel syndrome, right     symptoms    Cellulitis of face     Cocaine dependence with other cocaine-induced disorder (HCC)     DDD (degenerative disc disease), lumbar     mild    Eczema     Hypokalemia     Infectious disease     sphills     Lower back pain     Migraines     Myalgia     lumbar    Numbness and tingling in right hand     Pinched nerve     Sciatica     right lower extremity    Trichomonal vaginitis     Urticaria     UTI (urinary tract infection)     Vasovagal syncope          SURGICAL HISTORY       Past Surgical History:   Procedure Laterality Date    PARTIAL HYSTERECTOMY (CERVIX NOT REMOVED)      TONSILLECTOMY      TUBAL LIGATION           CURRENTMEDICATIONS       Previous Medications    ALBUTEROL (PROVENTIL) (2.5 MG/3ML) 0.083% NEBULIZER SOLUTION    Take 3 mLs by nebulization every 6 hours as needed for Wheezing    ALBUTEROL SULFATE HFA (PROVENTIL;VENTOLIN;PROAIR) 108 (90 BASE) MCG/ACT INHALER

## 2025-03-09 ENCOUNTER — HOSPITAL ENCOUNTER (EMERGENCY)
Facility: HOSPITAL | Age: 60
Discharge: HOME | End: 2025-03-09
Payer: MEDICAID

## 2025-03-09 ENCOUNTER — APPOINTMENT (OUTPATIENT)
Facility: HOSPITAL | Age: 60
End: 2025-03-09
Payer: MEDICAID

## 2025-03-09 VITALS
TEMPERATURE: 97.2 F | BODY MASS INDEX: 22.49 KG/M2 | HEIGHT: 65 IN | DIASTOLIC BLOOD PRESSURE: 83 MMHG | OXYGEN SATURATION: 95 % | SYSTOLIC BLOOD PRESSURE: 117 MMHG | WEIGHT: 135 LBS | HEART RATE: 107 BPM | RESPIRATION RATE: 23 BRPM

## 2025-03-09 DIAGNOSIS — J45.41 MODERATE PERSISTENT ASTHMA WITH EXACERBATION: Primary | ICD-10-CM

## 2025-03-09 DIAGNOSIS — N30.00 ACUTE CYSTITIS WITHOUT HEMATURIA: ICD-10-CM

## 2025-03-09 LAB
APPEARANCE UR: CLEAR
BACTERIA URNS QL MICRO: ABNORMAL /HPF
BILIRUB UR QL: NEGATIVE
COLOR UR: ABNORMAL
EPITH CASTS URNS QL MICRO: ABNORMAL /LPF (ref 0–5)
FLUAV RNA SPEC QL NAA+PROBE: NOT DETECTED
FLUBV RNA SPEC QL NAA+PROBE: NOT DETECTED
GLUCOSE UR STRIP.AUTO-MCNC: NEGATIVE MG/DL
HGB UR QL STRIP: ABNORMAL
KETONES UR QL STRIP.AUTO: ABNORMAL MG/DL
LEUKOCYTE ESTERASE UR QL STRIP.AUTO: ABNORMAL
NITRITE UR QL STRIP.AUTO: NEGATIVE
PH UR STRIP: 5.5 (ref 5–8)
PROT UR STRIP-MCNC: ABNORMAL MG/DL
RBC #/AREA URNS HPF: ABNORMAL /HPF (ref 0–5)
SARS-COV-2 RNA RESP QL NAA+PROBE: NOT DETECTED
SERVICE CMNT-IMP: NORMAL
SOURCE: NORMAL
SP GR UR REFRACTOMETRY: 1.02 (ref 1–1.03)
UROBILINOGEN UR QL STRIP.AUTO: 2 EU/DL (ref 0.2–1)
WBC URNS QL MICRO: ABNORMAL /HPF (ref 0–4)
WET PREP GENITAL: NORMAL

## 2025-03-09 PROCEDURE — 87636 SARSCOV2 & INF A&B AMP PRB: CPT

## 2025-03-09 PROCEDURE — 6360000002 HC RX W HCPCS: Performed by: PHYSICIAN ASSISTANT

## 2025-03-09 PROCEDURE — 99284 EMERGENCY DEPT VISIT MOD MDM: CPT

## 2025-03-09 PROCEDURE — 71046 X-RAY EXAM CHEST 2 VIEWS: CPT

## 2025-03-09 PROCEDURE — 81001 URINALYSIS AUTO W/SCOPE: CPT

## 2025-03-09 PROCEDURE — 87210 SMEAR WET MOUNT SALINE/INK: CPT

## 2025-03-09 PROCEDURE — 96365 THER/PROPH/DIAG IV INF INIT: CPT

## 2025-03-09 PROCEDURE — 96375 TX/PRO/DX INJ NEW DRUG ADDON: CPT

## 2025-03-09 PROCEDURE — 6370000000 HC RX 637 (ALT 250 FOR IP): Performed by: PHYSICIAN ASSISTANT

## 2025-03-09 RX ORDER — ALBUTEROL SULFATE 2.5 MG/.5ML
2.5 SOLUTION RESPIRATORY (INHALATION) EVERY 6 HOURS PRN
Qty: 20 ML | Refills: 0 | Status: SHIPPED | OUTPATIENT
Start: 2025-03-09

## 2025-03-09 RX ORDER — IPRATROPIUM BROMIDE AND ALBUTEROL SULFATE 2.5; .5 MG/3ML; MG/3ML
1 SOLUTION RESPIRATORY (INHALATION)
Status: COMPLETED | OUTPATIENT
Start: 2025-03-09 | End: 2025-03-09

## 2025-03-09 RX ORDER — PREDNISONE 50 MG/1
50 TABLET ORAL DAILY
Qty: 5 TABLET | Refills: 0 | Status: SHIPPED | OUTPATIENT
Start: 2025-03-09 | End: 2025-03-14

## 2025-03-09 RX ORDER — CEPHALEXIN 500 MG/1
500 CAPSULE ORAL 2 TIMES DAILY
Qty: 14 CAPSULE | Refills: 0 | Status: SHIPPED | OUTPATIENT
Start: 2025-03-09 | End: 2025-03-16

## 2025-03-09 RX ORDER — MAGNESIUM SULFATE IN WATER 40 MG/ML
2000 INJECTION, SOLUTION INTRAVENOUS
Status: COMPLETED | OUTPATIENT
Start: 2025-03-09 | End: 2025-03-09

## 2025-03-09 RX ORDER — DEXAMETHASONE SODIUM PHOSPHATE 4 MG/ML
10 INJECTION, SOLUTION INTRA-ARTICULAR; INTRALESIONAL; INTRAMUSCULAR; INTRAVENOUS; SOFT TISSUE ONCE
Status: COMPLETED | OUTPATIENT
Start: 2025-03-09 | End: 2025-03-09

## 2025-03-09 RX ORDER — ALBUTEROL SULFATE 90 UG/1
2 INHALANT RESPIRATORY (INHALATION) EVERY 6 HOURS PRN
Qty: 18 G | Refills: 0 | Status: SHIPPED | OUTPATIENT
Start: 2025-03-09

## 2025-03-09 RX ADMIN — IPRATROPIUM BROMIDE AND ALBUTEROL SULFATE 1 DOSE: .5; 3 SOLUTION RESPIRATORY (INHALATION) at 14:16

## 2025-03-09 RX ADMIN — MAGNESIUM SULFATE HEPTAHYDRATE 2000 MG: 40 INJECTION, SOLUTION INTRAVENOUS at 14:10

## 2025-03-09 RX ADMIN — IPRATROPIUM BROMIDE AND ALBUTEROL SULFATE 1 DOSE: 2.5; .5 SOLUTION RESPIRATORY (INHALATION) at 13:50

## 2025-03-09 RX ADMIN — IPRATROPIUM BROMIDE AND ALBUTEROL SULFATE 1 DOSE: .5; 3 SOLUTION RESPIRATORY (INHALATION) at 13:13

## 2025-03-09 RX ADMIN — DEXAMETHASONE SODIUM PHOSPHATE 10 MG: 4 INJECTION INTRA-ARTICULAR; INTRALESIONAL; INTRAMUSCULAR; INTRAVENOUS; SOFT TISSUE at 14:07

## 2025-03-09 ASSESSMENT — PAIN - FUNCTIONAL ASSESSMENT: PAIN_FUNCTIONAL_ASSESSMENT: NONE - DENIES PAIN

## 2025-03-09 NOTE — ED PROVIDER NOTES
EMERGENCY DEPARTMENT HISTORY AND PHYSICAL EXAM      Date: 3/9/2025  Patient Name: Oumou Martinez    History of Presenting Illness     Chief Complaint   Patient presents with    Wheezing    Cough    Nasal Congestion    Shortness of Breath           Vaginal Odor       History (Context): Oumou Martinez is a 59 y.o. female with significant PMHx for vasovagal syncope, asthma, migraine, eczema, polysubstance abuse presents ambulatory to the ED today.  Patient ports productive cough and congestion x 1 week with worsening shortness of breath and today.  Patient has history of asthma and she has not been using her inhalers.    Patient also reports increase in vaginal discharge and odor.  Patient reports she was recently treated for trichomonas and finished her course of antibiotics.  Patient denies sexual intercourse since this time.  Denies dysuria, hematuria, abdominal pain, vomiting diarrhea, fever, chills      PCP: Domingo Johnson Sr., MD    No current facility-administered medications for this encounter.     Current Outpatient Medications   Medication Sig Dispense Refill    albuterol sulfate HFA (PROAIR HFA) 108 (90 Base) MCG/ACT inhaler Inhale 2 puffs into the lungs every 6 hours as needed for Wheezing 18 g 0    predniSONE (DELTASONE) 50 MG tablet Take 1 tablet by mouth daily for 5 days 5 tablet 0    albuterol (PROVENTIL) 2.5 MG/0.5ML NEBU nebulizer solution Take 0.5 mLs by nebulization every 6 hours as needed for Wheezing 20 mL 0    cephALEXin (KEFLEX) 500 MG capsule Take 1 capsule by mouth 2 times daily for 7 days 14 capsule 0    oxyCODONE-acetaminophen (PERCOCET) 7.5-325 MG per tablet Take 1 tablet by mouth every 12 hours as needed. (Patient not taking: Reported on 4/18/2023)      diclofenac (VOLTAREN) 75 MG EC tablet Take 1 tablet by mouth 2 times daily (Patient not taking: Reported on 8/11/2023)         Past History     Past Medical History:   Past Medical History:   Diagnosis Date    Arthritis     Asthma

## 2025-03-09 NOTE — ED TRIAGE NOTES
Pt states that she has had a week of worsening cough, congestion, wheezing and SOB. She also C/O vaginal odor. She was treated for Trichomonas a month ago and again a week later with aditional BV and UTI. She continues to have vaginal odor. Denies sexual activity since last seen in ED.

## 2025-04-25 ENCOUNTER — APPOINTMENT (OUTPATIENT)
Facility: HOSPITAL | Age: 60
DRG: 141 | End: 2025-04-25
Attending: EMERGENCY MEDICINE
Payer: MEDICAID

## 2025-04-25 ENCOUNTER — APPOINTMENT (OUTPATIENT)
Facility: HOSPITAL | Age: 60
DRG: 141 | End: 2025-04-25
Payer: MEDICAID

## 2025-04-25 ENCOUNTER — HOSPITAL ENCOUNTER (INPATIENT)
Facility: HOSPITAL | Age: 60
LOS: 3 days | Discharge: HOME | DRG: 141 | End: 2025-04-28
Attending: EMERGENCY MEDICINE | Admitting: INTERNAL MEDICINE
Payer: MEDICAID

## 2025-04-25 DIAGNOSIS — J81.0 ACUTE PULMONARY EDEMA (HCC): ICD-10-CM

## 2025-04-25 DIAGNOSIS — J18.9 COMMUNITY ACQUIRED PNEUMONIA, UNSPECIFIED LATERALITY: ICD-10-CM

## 2025-04-25 DIAGNOSIS — J45.41 ACUTE SEVERE EXACERBATION OF MODERATE PERSISTENT ASTHMA: Primary | ICD-10-CM

## 2025-04-25 DIAGNOSIS — R65.10 SIRS (SYSTEMIC INFLAMMATORY RESPONSE SYNDROME) (HCC): ICD-10-CM

## 2025-04-25 DIAGNOSIS — R00.0 TACHYCARDIA: ICD-10-CM

## 2025-04-25 DIAGNOSIS — D69.6 THROMBOCYTOPENIA: ICD-10-CM

## 2025-04-25 DIAGNOSIS — A41.9 SEPSIS, DUE TO UNSPECIFIED ORGANISM, UNSPECIFIED WHETHER ACUTE ORGAN DYSFUNCTION PRESENT (HCC): ICD-10-CM

## 2025-04-25 DIAGNOSIS — E87.6 HYPOKALEMIA: ICD-10-CM

## 2025-04-25 PROBLEM — J45.901 PERSISTENT ASTHMA WITH ACUTE EXACERBATION, UNSPECIFIED ASTHMA SEVERITY: Status: ACTIVE | Noted: 2025-04-25

## 2025-04-25 LAB
ANION GAP SERPL CALC-SCNC: 7 MMOL/L (ref 3–18)
APTT PPP: 30.1 SEC (ref 23–36.4)
BASOPHILS # BLD: 0.02 K/UL (ref 0–0.1)
BASOPHILS NFR BLD: 0.2 % (ref 0–2)
BUN SERPL-MCNC: 11 MG/DL (ref 7–18)
BUN/CREAT SERPL: 15 (ref 12–20)
CALCIUM SERPL-MCNC: 9.1 MG/DL (ref 8.5–10.1)
CHLORIDE SERPL-SCNC: 107 MMOL/L (ref 100–111)
CO2 SERPL-SCNC: 25 MMOL/L (ref 21–32)
CREAT SERPL-MCNC: 0.72 MG/DL (ref 0.6–1.3)
D DIMER PPP FEU-MCNC: 1.23 UG/ML(FEU)
DIFFERENTIAL METHOD BLD: ABNORMAL
EKG ATRIAL RATE: 117 BPM
EKG DIAGNOSIS: NORMAL
EKG P AXIS: 66 DEGREES
EKG P-R INTERVAL: 154 MS
EKG Q-T INTERVAL: 318 MS
EKG QRS DURATION: 82 MS
EKG QTC CALCULATION (BAZETT): 443 MS
EKG R AXIS: -44 DEGREES
EKG T AXIS: 77 DEGREES
EKG VENTRICULAR RATE: 117 BPM
EOSINOPHIL # BLD: 0.11 K/UL (ref 0–0.4)
EOSINOPHIL NFR BLD: 1.2 % (ref 0–5)
ERYTHROCYTE [DISTWIDTH] IN BLOOD BY AUTOMATED COUNT: 12.5 % (ref 11.6–14.5)
FLUAV RNA SPEC QL NAA+PROBE: NOT DETECTED
FLUBV RNA SPEC QL NAA+PROBE: NOT DETECTED
GLUCOSE SERPL-MCNC: 138 MG/DL (ref 74–99)
HCT VFR BLD AUTO: 39.4 % (ref 35–45)
HGB BLD-MCNC: 13 G/DL (ref 12–16)
IMM GRANULOCYTES # BLD AUTO: 0.02 K/UL (ref 0–0.04)
IMM GRANULOCYTES NFR BLD AUTO: 0.2 % (ref 0–0.5)
INR PPP: 1.2 (ref 0.9–1.1)
LACTATE BLD-SCNC: 1.54 MMOL/L (ref 0.4–2)
LYMPHOCYTES # BLD: 1.98 K/UL (ref 0.9–3.6)
LYMPHOCYTES NFR BLD: 20.9 % (ref 21–52)
MCH RBC QN AUTO: 30.4 PG (ref 24–34)
MCHC RBC AUTO-ENTMCNC: 33 G/DL (ref 31–37)
MCV RBC AUTO: 92.3 FL (ref 78–100)
MONOCYTES # BLD: 0.71 K/UL (ref 0.05–1.2)
MONOCYTES NFR BLD: 7.5 % (ref 3–10)
NEUTS SEG # BLD: 6.64 K/UL (ref 1.8–8)
NEUTS SEG NFR BLD: 70 % (ref 40–73)
NRBC # BLD: 0 K/UL (ref 0–0.01)
NRBC BLD-RTO: 0 PER 100 WBC
NT PRO BNP: 5766 PG/ML (ref 0–900)
PLATELET # BLD AUTO: 126 K/UL (ref 135–420)
PMV BLD AUTO: 11.9 FL (ref 9.2–11.8)
POTASSIUM SERPL-SCNC: 3.1 MMOL/L (ref 3.5–5.5)
PROCALCITONIN SERPL-MCNC: <0.05 NG/ML
PROTHROMBIN TIME: 15.1 SEC (ref 11.9–14.9)
RBC # BLD AUTO: 4.27 M/UL (ref 4.2–5.3)
SARS-COV-2 RNA RESP QL NAA+PROBE: NOT DETECTED
SODIUM SERPL-SCNC: 139 MMOL/L (ref 136–145)
SOURCE: NORMAL
TROPONIN I SERPL HS-MCNC: 28 NG/L (ref 0–54)
TROPONIN I SERPL HS-MCNC: 31 NG/L (ref 0–54)
WBC # BLD AUTO: 9.5 K/UL (ref 4.6–13.2)

## 2025-04-25 PROCEDURE — 6360000002 HC RX W HCPCS: Performed by: EMERGENCY MEDICINE

## 2025-04-25 PROCEDURE — 6360000004 HC RX CONTRAST MEDICATION: Performed by: EMERGENCY MEDICINE

## 2025-04-25 PROCEDURE — 96366 THER/PROPH/DIAG IV INF ADDON: CPT

## 2025-04-25 PROCEDURE — 6370000000 HC RX 637 (ALT 250 FOR IP): Performed by: INTERNAL MEDICINE

## 2025-04-25 PROCEDURE — 85730 THROMBOPLASTIN TIME PARTIAL: CPT

## 2025-04-25 PROCEDURE — 84484 ASSAY OF TROPONIN QUANT: CPT

## 2025-04-25 PROCEDURE — 71045 X-RAY EXAM CHEST 1 VIEW: CPT

## 2025-04-25 PROCEDURE — 99223 1ST HOSP IP/OBS HIGH 75: CPT | Performed by: INTERNAL MEDICINE

## 2025-04-25 PROCEDURE — 85610 PROTHROMBIN TIME: CPT

## 2025-04-25 PROCEDURE — 80048 BASIC METABOLIC PNL TOTAL CA: CPT

## 2025-04-25 PROCEDURE — 94762 N-INVAS EAR/PLS OXIMTRY CONT: CPT

## 2025-04-25 PROCEDURE — 83880 ASSAY OF NATRIURETIC PEPTIDE: CPT

## 2025-04-25 PROCEDURE — 87636 SARSCOV2 & INF A&B AMP PRB: CPT

## 2025-04-25 PROCEDURE — 99285 EMERGENCY DEPT VISIT HI MDM: CPT

## 2025-04-25 PROCEDURE — 87040 BLOOD CULTURE FOR BACTERIA: CPT

## 2025-04-25 PROCEDURE — 2580000003 HC RX 258: Performed by: EMERGENCY MEDICINE

## 2025-04-25 PROCEDURE — 2140000001 HC CVICU INTERMEDIATE R&B

## 2025-04-25 PROCEDURE — 84145 PROCALCITONIN (PCT): CPT

## 2025-04-25 PROCEDURE — 93010 ELECTROCARDIOGRAM REPORT: CPT | Performed by: INTERNAL MEDICINE

## 2025-04-25 PROCEDURE — 2500000003 HC RX 250 WO HCPCS: Performed by: EMERGENCY MEDICINE

## 2025-04-25 PROCEDURE — 2500000003 HC RX 250 WO HCPCS: Performed by: INTERNAL MEDICINE

## 2025-04-25 PROCEDURE — 93005 ELECTROCARDIOGRAM TRACING: CPT | Performed by: EMERGENCY MEDICINE

## 2025-04-25 PROCEDURE — 96365 THER/PROPH/DIAG IV INF INIT: CPT

## 2025-04-25 PROCEDURE — 85025 COMPLETE CBC W/AUTO DIFF WBC: CPT

## 2025-04-25 PROCEDURE — 6360000002 HC RX W HCPCS: Performed by: INTERNAL MEDICINE

## 2025-04-25 PROCEDURE — 71275 CT ANGIOGRAPHY CHEST: CPT

## 2025-04-25 PROCEDURE — 94640 AIRWAY INHALATION TREATMENT: CPT

## 2025-04-25 PROCEDURE — 83605 ASSAY OF LACTIC ACID: CPT

## 2025-04-25 PROCEDURE — 85379 FIBRIN DEGRADATION QUANT: CPT

## 2025-04-25 PROCEDURE — 96375 TX/PRO/DX INJ NEW DRUG ADDON: CPT

## 2025-04-25 PROCEDURE — 6370000000 HC RX 637 (ALT 250 FOR IP): Performed by: EMERGENCY MEDICINE

## 2025-04-25 RX ORDER — ACETAMINOPHEN 325 MG/1
650 TABLET ORAL EVERY 6 HOURS PRN
Status: DISCONTINUED | OUTPATIENT
Start: 2025-04-25 | End: 2025-04-28 | Stop reason: HOSPADM

## 2025-04-25 RX ORDER — ALBUTEROL SULFATE 90 UG/1
2 INHALANT RESPIRATORY (INHALATION) EVERY 6 HOURS PRN
Status: DISCONTINUED | OUTPATIENT
Start: 2025-04-25 | End: 2025-04-28 | Stop reason: HOSPADM

## 2025-04-25 RX ORDER — SODIUM CHLORIDE 0.9 % (FLUSH) 0.9 %
5-40 SYRINGE (ML) INJECTION EVERY 12 HOURS SCHEDULED
Status: DISCONTINUED | OUTPATIENT
Start: 2025-04-25 | End: 2025-04-28 | Stop reason: HOSPADM

## 2025-04-25 RX ORDER — MAGNESIUM SULFATE IN WATER 40 MG/ML
2000 INJECTION, SOLUTION INTRAVENOUS ONCE
Status: COMPLETED | OUTPATIENT
Start: 2025-04-25 | End: 2025-04-25

## 2025-04-25 RX ORDER — ONDANSETRON 4 MG/1
4 TABLET, ORALLY DISINTEGRATING ORAL EVERY 8 HOURS PRN
Status: DISCONTINUED | OUTPATIENT
Start: 2025-04-25 | End: 2025-04-28 | Stop reason: HOSPADM

## 2025-04-25 RX ORDER — ALBUTEROL SULFATE 0.83 MG/ML
2.5 SOLUTION RESPIRATORY (INHALATION) EVERY 6 HOURS PRN
Status: DISCONTINUED | OUTPATIENT
Start: 2025-04-25 | End: 2025-04-28 | Stop reason: HOSPADM

## 2025-04-25 RX ORDER — SODIUM CHLORIDE 9 MG/ML
INJECTION, SOLUTION INTRAVENOUS PRN
Status: DISCONTINUED | OUTPATIENT
Start: 2025-04-25 | End: 2025-04-28 | Stop reason: HOSPADM

## 2025-04-25 RX ORDER — IPRATROPIUM BROMIDE AND ALBUTEROL SULFATE 2.5; .5 MG/3ML; MG/3ML
1 SOLUTION RESPIRATORY (INHALATION)
Status: COMPLETED | OUTPATIENT
Start: 2025-04-25 | End: 2025-04-25

## 2025-04-25 RX ORDER — BUDESONIDE 0.5 MG/2ML
0.5 INHALANT ORAL
Status: COMPLETED | OUTPATIENT
Start: 2025-04-25 | End: 2025-04-25

## 2025-04-25 RX ORDER — ENOXAPARIN SODIUM 100 MG/ML
40 INJECTION SUBCUTANEOUS DAILY
Status: DISCONTINUED | OUTPATIENT
Start: 2025-04-25 | End: 2025-04-28 | Stop reason: HOSPADM

## 2025-04-25 RX ORDER — POTASSIUM CHLORIDE 1500 MG/1
40 TABLET, EXTENDED RELEASE ORAL
Status: COMPLETED | OUTPATIENT
Start: 2025-04-25 | End: 2025-04-25

## 2025-04-25 RX ORDER — ACETAMINOPHEN 650 MG/1
650 SUPPOSITORY RECTAL EVERY 6 HOURS PRN
Status: DISCONTINUED | OUTPATIENT
Start: 2025-04-25 | End: 2025-04-28 | Stop reason: HOSPADM

## 2025-04-25 RX ORDER — POLYETHYLENE GLYCOL 3350 17 G/17G
17 POWDER, FOR SOLUTION ORAL DAILY PRN
Status: DISCONTINUED | OUTPATIENT
Start: 2025-04-25 | End: 2025-04-28 | Stop reason: HOSPADM

## 2025-04-25 RX ORDER — SODIUM CHLORIDE 0.9 % (FLUSH) 0.9 %
5-40 SYRINGE (ML) INJECTION PRN
Status: DISCONTINUED | OUTPATIENT
Start: 2025-04-25 | End: 2025-04-28 | Stop reason: HOSPADM

## 2025-04-25 RX ORDER — IPRATROPIUM BROMIDE AND ALBUTEROL SULFATE 2.5; .5 MG/3ML; MG/3ML
1 SOLUTION RESPIRATORY (INHALATION)
Status: DISCONTINUED | OUTPATIENT
Start: 2025-04-25 | End: 2025-04-27

## 2025-04-25 RX ORDER — ONDANSETRON 2 MG/ML
4 INJECTION INTRAMUSCULAR; INTRAVENOUS EVERY 6 HOURS PRN
Status: DISCONTINUED | OUTPATIENT
Start: 2025-04-25 | End: 2025-04-28 | Stop reason: HOSPADM

## 2025-04-25 RX ORDER — MAGNESIUM SULFATE IN WATER 40 MG/ML
2000 INJECTION, SOLUTION INTRAVENOUS PRN
Status: DISCONTINUED | OUTPATIENT
Start: 2025-04-25 | End: 2025-04-28 | Stop reason: HOSPADM

## 2025-04-25 RX ORDER — FUROSEMIDE 10 MG/ML
40 INJECTION INTRAMUSCULAR; INTRAVENOUS
Status: COMPLETED | OUTPATIENT
Start: 2025-04-25 | End: 2025-04-25

## 2025-04-25 RX ORDER — POTASSIUM CHLORIDE 7.45 MG/ML
10 INJECTION INTRAVENOUS PRN
Status: DISCONTINUED | OUTPATIENT
Start: 2025-04-25 | End: 2025-04-28 | Stop reason: HOSPADM

## 2025-04-25 RX ORDER — POTASSIUM CHLORIDE 1500 MG/1
40 TABLET, EXTENDED RELEASE ORAL PRN
Status: DISCONTINUED | OUTPATIENT
Start: 2025-04-25 | End: 2025-04-28 | Stop reason: HOSPADM

## 2025-04-25 RX ORDER — BUDESONIDE 0.25 MG/2ML
0.25 INHALANT ORAL
Status: DISCONTINUED | OUTPATIENT
Start: 2025-04-25 | End: 2025-04-25

## 2025-04-25 RX ORDER — IOPAMIDOL 755 MG/ML
80 INJECTION, SOLUTION INTRAVASCULAR
Status: COMPLETED | OUTPATIENT
Start: 2025-04-25 | End: 2025-04-25

## 2025-04-25 RX ORDER — 0.9 % SODIUM CHLORIDE 0.9 %
500 INTRAVENOUS SOLUTION INTRAVENOUS ONCE
Status: COMPLETED | OUTPATIENT
Start: 2025-04-25 | End: 2025-04-25

## 2025-04-25 RX ORDER — OXYCODONE AND ACETAMINOPHEN 7.5; 325 MG/1; MG/1
1 TABLET ORAL EVERY 12 HOURS PRN
Status: DISCONTINUED | OUTPATIENT
Start: 2025-04-25 | End: 2025-04-28 | Stop reason: HOSPADM

## 2025-04-25 RX ADMIN — IPRATROPIUM BROMIDE AND ALBUTEROL SULFATE 1 DOSE: .5; 3 SOLUTION RESPIRATORY (INHALATION) at 20:13

## 2025-04-25 RX ADMIN — SODIUM CHLORIDE 500 ML: 0.9 INJECTION, SOLUTION INTRAVENOUS at 12:23

## 2025-04-25 RX ADMIN — ENOXAPARIN SODIUM 40 MG: 100 INJECTION SUBCUTANEOUS at 20:54

## 2025-04-25 RX ADMIN — CEFTRIAXONE 1000 MG: 1 INJECTION, POWDER, FOR SOLUTION INTRAMUSCULAR; INTRAVENOUS at 15:05

## 2025-04-25 RX ADMIN — IPRATROPIUM BROMIDE AND ALBUTEROL SULFATE 1 DOSE: .5; 3 SOLUTION RESPIRATORY (INHALATION) at 12:12

## 2025-04-25 RX ADMIN — IPRATROPIUM BROMIDE AND ALBUTEROL SULFATE 1 DOSE: .5; 3 SOLUTION RESPIRATORY (INHALATION) at 13:41

## 2025-04-25 RX ADMIN — FUROSEMIDE 40 MG: 10 INJECTION, SOLUTION INTRAMUSCULAR; INTRAVENOUS at 15:59

## 2025-04-25 RX ADMIN — POTASSIUM CHLORIDE 40 MEQ: 1500 TABLET, EXTENDED RELEASE ORAL at 13:41

## 2025-04-25 RX ADMIN — METHYLPREDNISOLONE SODIUM SUCCINATE 125 MG: 125 INJECTION INTRAMUSCULAR; INTRAVENOUS at 12:23

## 2025-04-25 RX ADMIN — SODIUM CHLORIDE, PRESERVATIVE FREE 10 ML: 5 INJECTION INTRAVENOUS at 20:54

## 2025-04-25 RX ADMIN — MAGNESIUM SULFATE HEPTAHYDRATE 2000 MG: 40 INJECTION, SOLUTION INTRAVENOUS at 12:30

## 2025-04-25 RX ADMIN — BUDESONIDE 500 MCG: 0.5 INHALANT RESPIRATORY (INHALATION) at 12:19

## 2025-04-25 RX ADMIN — IOPAMIDOL 80 ML: 755 INJECTION, SOLUTION INTRAVENOUS at 13:38

## 2025-04-25 RX ADMIN — IPRATROPIUM BROMIDE AND ALBUTEROL SULFATE 1 DOSE: .5; 3 SOLUTION RESPIRATORY (INHALATION) at 11:46

## 2025-04-25 RX ADMIN — AZITHROMYCIN MONOHYDRATE 500 MG: 500 INJECTION, POWDER, LYOPHILIZED, FOR SOLUTION INTRAVENOUS at 16:06

## 2025-04-25 RX ADMIN — METHYLPREDNISOLONE SODIUM SUCCINATE 40 MG: 40 INJECTION INTRAMUSCULAR; INTRAVENOUS at 23:26

## 2025-04-25 ASSESSMENT — PAIN - FUNCTIONAL ASSESSMENT: PAIN_FUNCTIONAL_ASSESSMENT: 0-10

## 2025-04-25 ASSESSMENT — PAIN DESCRIPTION - LOCATION: LOCATION: CHEST

## 2025-04-25 ASSESSMENT — PAIN SCALES - GENERAL: PAINLEVEL_OUTOF10: 3

## 2025-04-25 NOTE — ED NOTES
Attempted to call report to Ashley at [phone].  Fast track here to transport patient now. Report given to Ashley.

## 2025-04-25 NOTE — ED NOTES
TRANSFER - OUT REPORT:    Verbal report given to Bravo on Oumou Martinez  being transferred to [facility] for routine progression of patient care       Report consisted of patient's Situation, Background, Assessment and   Recommendations(SBAR).     Information from the following report(s) Nurse Handoff Report was reviewed with the receiving nurse.    Angelica Fall Assessment:    Presents to emergency department  because of falls (Syncope, seizure, or loss of consciousness): No  Age > 70: No  Altered Mental Status, Intoxication with alcohol or substance confusion (Disorientation, impaired judgment, poor safety awaremess, or inability to follow instructions): No  Impaired Mobility: Ambulates or transfers with assistive devices or assistance; Unable to ambulate or transer.: No  Nursing Judgement: No          Lines:   Peripheral IV 04/25/25 Right Antecubital (Active)   Site Assessment Clean, dry & intact 04/25/25 1150   Line Status Blood return noted;Normal saline locked 04/25/25 1150   Line Care Cap changed;Connections checked and tightened 04/25/25 1150   Phlebitis Assessment No symptoms 04/25/25 1150   Infiltration Assessment 0 04/25/25 1150   Dressing Status New dressing applied;Clean, dry & intact 04/25/25 1150   Dressing Type Transparent 04/25/25 1150   Dressing Intervention New 04/25/25 1150        Opportunity for questions and clarification was provided.      Patient transported with:  [facility] jerry

## 2025-04-25 NOTE — ED TRIAGE NOTES
Patient presents to ER with c/o shortness of breath and asthma exacerbation that started on Monday. Patient reports usinf her home inhalers with no relief.

## 2025-04-25 NOTE — H&P
History and Physical    Patient: Oumou Martinez MRN: [id number]  SSN: [id number]    YOB: 1965    Age: 59 y.o.  Sex: female    Domingo Johnson Sr., MD    C/C : Shortness of breath    Subjective:      Oumou Martinez is a 59 y.o. female with PMH of bronchial asthma, migraine headaches, cocaine use now being admitted with increasing shortness of breath about 2 days.  Patient went to [facility] ED from where she is admitted now for persistent bronchospasm from asthma exacerbation.  Denies any fever chills rigors her initial viral studies were negative.  Chest x-ray no obvious infiltrate CT chest suggestive of possibility of groundglass opacity bilaterally questionable pneumonia.  Patient is admitted with IV antibiotic and bronchodilator and steroids.    No other significant history can be obtained from patient at this point.    Past Medical History:   Diagnosis Date    Arthritis     Asthma     Bilateral leg pain     Carpal tunnel syndrome, right     symptoms    Cellulitis of face     Cocaine dependence with other cocaine-induced disorder (HCC)     DDD (degenerative disc disease), lumbar     mild    Eczema     Hypokalemia     Infectious disease     sphills     Lower back pain     Migraines     Myalgia     lumbar    Numbness and tingling in right hand     Pinched nerve     Sciatica     right lower extremity    Trichomonal vaginitis     Urticaria     UTI (urinary tract infection)     Vasovagal syncope      Past Surgical History:   Procedure Laterality Date    PARTIAL HYSTERECTOMY (CERVIX NOT REMOVED)      TONSILLECTOMY      TUBAL LIGATION        Family History   Problem Relation Age of Onset    Hypertension Other     Asthma Other     Hypertension Mother     Osteoarthritis Mother      Social History     Tobacco Use    Smoking status: Every Day     Current packs/day: 0.00     Types: Cigarettes     Last attempt to quit: 3/1/2012     Years since quittin.1    Smokeless tobacco: Never   Substance Use Topics

## 2025-04-25 NOTE — ED PROVIDER NOTES
Answer:  No    Last MAR action: Given - by ADAM RUIZ on 04/25/25 at 1212 DEWS, ANDI CARABALLO    04/25/25 1230 04/25/25 1208  ipratropium 0.5 mg-albuterol 2.5 mg (DUONEB) nebulizer solution 1 Dose  NOW        Question:  Initiate RT Bronchodilator Protocol  Answer:  No    Last MAR action: Given - by ADAM RUIZ on 04/25/25 at 1212 DEWS, ANDI CARABALLO    04/25/25 1230 04/25/25 1208  magnesium sulfate 2000 mg in 50 mL IVPB premix  ONCE         Last MAR action: Stopped - by ERNESTO GUY on 04/25/25 at 1409 DEWS, ANDI CARABALLO    04/25/25 1230 04/25/25 1208  sodium chloride 0.9 % bolus 500 mL  ONCE         Last MAR action: Stopped - by ERNESTO GUY on 04/25/25 at 1316 DEWS, ANDI CARABALLO    04/25/25 1215 04/25/25 1158  methylPREDNISolone sodium succ (SOLU-MEDROL) 125 mg in sterile water 2 mL injection  NOW         Last MAR action: Given - by ERNESTO GUY on 04/25/25 at 1223 DEWS, ANDI CARABALLO    04/25/25 1200 04/25/25 1141  ipratropium 0.5 mg-albuterol 2.5 mg (DUONEB) nebulizer solution 1 Dose  NOW        Question:  Initiate RT Bronchodilator Protocol  Answer:  Yes - Inpatient Protocol    Last MAR action: Given - by ADAM RUIZ on 04/25/25 at 1146 DEWSANDI             EMERGENCY DEPARTMENT COURSE and DIFFERENTIAL DIAGNOSIS/MDM:   Vitals:    Vitals:    04/25/25 1330 04/25/25 1400 04/25/25 1430 04/25/25 1500   BP: (!) 120/90 123/82 114/79 114/81   Pulse: (!) 110 (!) 110 (!) 109 (!) 111   Resp: 21 24 24 22   Temp:       TempSrc:       SpO2: 97% 92%  96%   Weight:       Height:           DDx: Including but not limited to acute abdomen exacerbation, COVID-19, flu, pneumonia, viral syndrome, PE, cardiac    Medical Decision Making  Amount and/or Complexity of Data Reviewed  Labs: ordered.  Radiology: ordered.  ECG/medicine tests: ordered.    Risk  Prescription drug management.  Decision regarding hospitalization.      59-year-old female with history of asthma here with shortness of breath and severe wheezing, also

## 2025-04-26 LAB
AMPHET UR QL SCN: NEGATIVE
ANION GAP SERPL CALC-SCNC: 5 MMOL/L (ref 3–18)
BARBITURATES UR QL SCN: NEGATIVE
BENZODIAZ UR QL: NEGATIVE
BUN SERPL-MCNC: 17 MG/DL (ref 7–18)
BUN/CREAT SERPL: 24 (ref 12–20)
CALCIUM SERPL-MCNC: 9.2 MG/DL (ref 8.5–10.1)
CANNABINOIDS UR QL SCN: NEGATIVE
CHLORIDE SERPL-SCNC: 109 MMOL/L (ref 100–111)
CO2 SERPL-SCNC: 26 MMOL/L (ref 21–32)
COCAINE UR QL SCN: POSITIVE
CREAT SERPL-MCNC: 0.7 MG/DL (ref 0.6–1.3)
ERYTHROCYTE [DISTWIDTH] IN BLOOD BY AUTOMATED COUNT: 12.6 % (ref 11.6–14.5)
GLUCOSE SERPL-MCNC: 120 MG/DL (ref 74–99)
HCT VFR BLD AUTO: 41.4 % (ref 35–45)
HGB BLD-MCNC: 13.1 G/DL (ref 12–16)
Lab: ABNORMAL
MCH RBC QN AUTO: 30.1 PG (ref 24–34)
MCHC RBC AUTO-ENTMCNC: 31.6 G/DL (ref 31–37)
MCV RBC AUTO: 95.2 FL (ref 78–100)
METHADONE UR QL: NEGATIVE
NRBC # BLD: 0 K/UL (ref 0–0.01)
NRBC BLD-RTO: 0 PER 100 WBC
OPIATES UR QL: NEGATIVE
PCP UR QL: NEGATIVE
PLATELET # BLD AUTO: 114 K/UL (ref 135–420)
PMV BLD AUTO: 12.9 FL (ref 9.2–11.8)
POTASSIUM SERPL-SCNC: 4 MMOL/L (ref 3.5–5.5)
RBC # BLD AUTO: 4.35 M/UL (ref 4.2–5.3)
SODIUM SERPL-SCNC: 140 MMOL/L (ref 136–145)
WBC # BLD AUTO: 5 K/UL (ref 4.6–13.2)

## 2025-04-26 PROCEDURE — 2500000003 HC RX 250 WO HCPCS: Performed by: INTERNAL MEDICINE

## 2025-04-26 PROCEDURE — 2580000003 HC RX 258: Performed by: INTERNAL MEDICINE

## 2025-04-26 PROCEDURE — 94640 AIRWAY INHALATION TREATMENT: CPT

## 2025-04-26 PROCEDURE — 6370000000 HC RX 637 (ALT 250 FOR IP): Performed by: INTERNAL MEDICINE

## 2025-04-26 PROCEDURE — 99232 SBSQ HOSP IP/OBS MODERATE 35: CPT | Performed by: INTERNAL MEDICINE

## 2025-04-26 PROCEDURE — 6360000002 HC RX W HCPCS: Performed by: INTERNAL MEDICINE

## 2025-04-26 PROCEDURE — 80048 BASIC METABOLIC PNL TOTAL CA: CPT

## 2025-04-26 PROCEDURE — 80307 DRUG TEST PRSMV CHEM ANLYZR: CPT

## 2025-04-26 PROCEDURE — 1100000003 HC PRIVATE W/ TELEMETRY

## 2025-04-26 PROCEDURE — 85027 COMPLETE CBC AUTOMATED: CPT

## 2025-04-26 PROCEDURE — 36415 COLL VENOUS BLD VENIPUNCTURE: CPT

## 2025-04-26 PROCEDURE — 94761 N-INVAS EAR/PLS OXIMETRY MLT: CPT

## 2025-04-26 RX ORDER — GUAIFENESIN 200 MG/10ML
200 LIQUID ORAL EVERY 4 HOURS PRN
Status: DISCONTINUED | OUTPATIENT
Start: 2025-04-26 | End: 2025-04-28 | Stop reason: HOSPADM

## 2025-04-26 RX ADMIN — GUAIFENESIN 200 MG: 200 SOLUTION ORAL at 14:32

## 2025-04-26 RX ADMIN — IPRATROPIUM BROMIDE AND ALBUTEROL SULFATE 1 DOSE: .5; 3 SOLUTION RESPIRATORY (INHALATION) at 16:22

## 2025-04-26 RX ADMIN — ENOXAPARIN SODIUM 40 MG: 100 INJECTION SUBCUTANEOUS at 08:53

## 2025-04-26 RX ADMIN — METHYLPREDNISOLONE SODIUM SUCCINATE 40 MG: 40 INJECTION INTRAMUSCULAR; INTRAVENOUS at 22:39

## 2025-04-26 RX ADMIN — AZITHROMYCIN MONOHYDRATE 500 MG: 500 INJECTION, POWDER, LYOPHILIZED, FOR SOLUTION INTRAVENOUS at 16:22

## 2025-04-26 RX ADMIN — IPRATROPIUM BROMIDE AND ALBUTEROL SULFATE 1 DOSE: .5; 3 SOLUTION RESPIRATORY (INHALATION) at 20:02

## 2025-04-26 RX ADMIN — METHYLPREDNISOLONE SODIUM SUCCINATE 40 MG: 40 INJECTION INTRAMUSCULAR; INTRAVENOUS at 08:53

## 2025-04-26 RX ADMIN — METHYLPREDNISOLONE SODIUM SUCCINATE 40 MG: 40 INJECTION INTRAMUSCULAR; INTRAVENOUS at 16:14

## 2025-04-26 RX ADMIN — SODIUM CHLORIDE, PRESERVATIVE FREE 10 ML: 5 INJECTION INTRAVENOUS at 08:53

## 2025-04-26 RX ADMIN — SODIUM CHLORIDE, PRESERVATIVE FREE 10 ML: 5 INJECTION INTRAVENOUS at 20:49

## 2025-04-26 RX ADMIN — IPRATROPIUM BROMIDE AND ALBUTEROL SULFATE 1 DOSE: .5; 3 SOLUTION RESPIRATORY (INHALATION) at 07:52

## 2025-04-26 RX ADMIN — IPRATROPIUM BROMIDE AND ALBUTEROL SULFATE 1 DOSE: .5; 3 SOLUTION RESPIRATORY (INHALATION) at 13:25

## 2025-04-26 RX ADMIN — OXYCODONE AND ACETAMINOPHEN 1 TABLET: 7.5; 325 TABLET ORAL at 13:28

## 2025-04-26 ASSESSMENT — PAIN SCALES - GENERAL
PAINLEVEL_OUTOF10: 0
PAINLEVEL_OUTOF10: 0
PAINLEVEL_OUTOF10: 9
PAINLEVEL_OUTOF10: 5
PAINLEVEL_OUTOF10: 5
PAINLEVEL_OUTOF10: 0

## 2025-04-26 ASSESSMENT — PAIN DESCRIPTION - ORIENTATION: ORIENTATION: RIGHT

## 2025-04-26 ASSESSMENT — PAIN DESCRIPTION - ONSET: ONSET: GRADUAL

## 2025-04-26 ASSESSMENT — PAIN DESCRIPTION - LOCATION: LOCATION: HIP

## 2025-04-26 ASSESSMENT — PAIN - FUNCTIONAL ASSESSMENT: PAIN_FUNCTIONAL_ASSESSMENT: ACTIVITIES ARE NOT PREVENTED

## 2025-04-26 ASSESSMENT — PAIN DESCRIPTION - PAIN TYPE: TYPE: CHRONIC PAIN

## 2025-04-26 ASSESSMENT — PAIN DESCRIPTION - FREQUENCY: FREQUENCY: INTERMITTENT

## 2025-04-26 ASSESSMENT — PAIN DESCRIPTION - DESCRIPTORS: DESCRIPTORS: STABBING;THROBBING;SHOOTING

## 2025-04-26 NOTE — PROGRESS NOTES
Bedside and Verbal shift change report given to Sudhir Parada RN (oncoming nurse) by BRITNI Ramirez (offgoing nurse). Report included the following information Nurse Handoff Report, Intake/Output, MAR, Recent Results, and Cardiac Rhythm Sinus tach / NSR .

## 2025-04-26 NOTE — PROGRESS NOTES
[facility] Hospital[facility]  Progress Note    Patient: Oumou Martinez Age: 59 y.o. : 1965 MR#: [id number] SSN: [id number]  Date/Time: 2025     C/C:         HPI : 59-year-old female past history of bronchial asthma recurrent episodes also history of cocaine abuse, once again admitted with severe bouts of asthma increasing shortness of breath and new work of breathing came to emergency room initial treatment was given with bronchodilators steroids and magnesium but without any improvement patient was admitted to [facility].  Subjective:  Today patient is alert awake oriented  Patient is accompanied by her daughter and son in the room with her  With patient's permission patient's condition was discussed  Strong recommendation to stop use of cocaine               Review of Systems:  positive responses in bold type   Constitutional: Negative for fever, chills, diaphoresis and unexpected weight change.   HENT: Negative for ear pain, congestion, sore throat, rhinorrhea, drooling, trouble swallowing, neck pain and tinnitus.   Eyes: Negative for photophobia, pain, redness and visual disturbance.   Respiratory: Positive for shortness of breath, cough, choking, chest tightness, wheezing or stridor.   Cardiovascular: Negative for chest pain, palpitations and leg swelling.   Gastrointestinal: Negative for nausea, vomiting, abdominal pain, diarrhea, constipation, blood in stool, abdominal distention and anal bleeding.   Genitourinary: Negative for dysuria, urgency, frequency, hematuria, flank pain and difficulty urinating.   Musculoskeletal: Negative for back pain and arthralgias.   Skin: Negative for color change, rash and wound.   Neurological: Negative for dizziness, seizures, syncope, speech difficulty, light-headedness or headaches.   Hematological: Does not bruise/bleed easily.   Psychiatric/Behavioral: Negative for suicidal ideas, hallucinations, behavioral problems,

## 2025-04-26 NOTE — PLAN OF CARE
Problem: Discharge Planning  Goal: Discharge to home or other facility with appropriate resources  Outcome: Progressing  Flowsheets  Taken 4/26/2025 1023 by Meme Levin, RN  Discharge to home or other facility with appropriate resources:   Identify barriers to discharge with patient and caregiver   Arrange for needed discharge resources and transportation as appropriate  Taken 4/25/2025 2232 by Ashley Tucker RN  Discharge to home or other facility with appropriate resources:   Identify barriers to discharge with patient and caregiver   Arrange for needed discharge resources and transportation as appropriate   Identify discharge learning needs (meds, wound care, etc)     Problem: Pain  Goal: Verbalizes/displays adequate comfort level or baseline comfort level  Outcome: Progressing  Flowsheets (Taken 4/26/2025 1054)  Verbalizes/displays adequate comfort level or baseline comfort level:   Encourage patient to monitor pain and request assistance   Assess pain using appropriate pain scale     Problem: Respiratory - Adult  Goal: Achieves optimal ventilation and oxygenation  Outcome: Progressing  Flowsheets (Taken 4/26/2025 1054)  Achieves optimal ventilation and oxygenation:   Assess for changes in respiratory status   Assess for changes in mentation and behavior   Position to facilitate oxygenation and minimize respiratory effort   Assess and instruct to report shortness of breath or any respiratory difficulty   Respiratory therapy support as indicated     Problem: Cardiovascular - Adult  Goal: Maintains optimal cardiac output and hemodynamic stability  Outcome: Progressing  Flowsheets (Taken 4/26/2025 1054)  Maintains optimal cardiac output and hemodynamic stability: Monitor blood pressure and heart rate     Problem: Cardiovascular - Adult  Goal: Absence of cardiac dysrhythmias or at baseline  Outcome: Progressing  Flowsheets (Taken 4/26/2025 1054)  Absence of cardiac dysrhythmias or at baseline:

## 2025-04-26 NOTE — PROGRESS NOTES
4 Eyes Skin Assessment     NAME:  Oumou Martinez  YOB: 1965  MEDICAL RECORD NUMBER:  [id number]    The patient is being assessed for  Shift Handoff    I agree that at least one RN has performed a thorough Head to Toe Skin Assessment on the patient. ALL assessment sites listed below have been assessed.      Areas assessed by both nurses:    Head, Face, Ears, Shoulders, Back, Chest, Arms, Elbows, Hands, Sacrum. Buttock, Coccyx, Ischium, and Legs. Feet and Heels        Does the Patient have a Wound? No noted wound(s)       Naldo Prevention initiated by RN: No  Wound Care Orders initiated by RN: No    Pressure Injury (Stage 3,4, Unstageable, DTI, NWPT, and Complex wounds) if present, place Wound referral order by RN under : No    New Ostomies, if present place, Ostomy referral order under : No     Nurse 1 eSignature: Electronically signed by Meme Levin RN on 4/26/25 at 6:18 PM EDT    **SHARE this note so that the co-signing nurse can place an eSignature**    Nurse 2 eSignature: {Esignature:[id number]}'

## 2025-04-27 LAB
ANION GAP SERPL CALC-SCNC: 5 MMOL/L (ref 3–18)
BUN SERPL-MCNC: 18 MG/DL (ref 7–18)
BUN/CREAT SERPL: 24 (ref 12–20)
CALCIUM SERPL-MCNC: 9.1 MG/DL (ref 8.5–10.1)
CHLORIDE SERPL-SCNC: 111 MMOL/L (ref 100–111)
CO2 SERPL-SCNC: 25 MMOL/L (ref 21–32)
CREAT SERPL-MCNC: 0.74 MG/DL (ref 0.6–1.3)
ERYTHROCYTE [DISTWIDTH] IN BLOOD BY AUTOMATED COUNT: 13.1 % (ref 11.6–14.5)
GLUCOSE SERPL-MCNC: 112 MG/DL (ref 74–99)
HCT VFR BLD AUTO: 42.5 % (ref 35–45)
HGB BLD-MCNC: 13.3 G/DL (ref 12–16)
MCH RBC QN AUTO: 29.3 PG (ref 24–34)
MCHC RBC AUTO-ENTMCNC: 31.3 G/DL (ref 31–37)
MCV RBC AUTO: 93.6 FL (ref 78–100)
NRBC # BLD: 0 K/UL (ref 0–0.01)
NRBC BLD-RTO: 0 PER 100 WBC
PLATELET # BLD AUTO: 136 K/UL (ref 135–420)
PMV BLD AUTO: 12.9 FL (ref 9.2–11.8)
POTASSIUM SERPL-SCNC: 4.1 MMOL/L (ref 3.5–5.5)
RBC # BLD AUTO: 4.54 M/UL (ref 4.2–5.3)
SODIUM SERPL-SCNC: 141 MMOL/L (ref 136–145)
WBC # BLD AUTO: 10.3 K/UL (ref 4.6–13.2)

## 2025-04-27 PROCEDURE — 94640 AIRWAY INHALATION TREATMENT: CPT

## 2025-04-27 PROCEDURE — 6360000002 HC RX W HCPCS: Performed by: INTERNAL MEDICINE

## 2025-04-27 PROCEDURE — 6370000000 HC RX 637 (ALT 250 FOR IP): Performed by: INTERNAL MEDICINE

## 2025-04-27 PROCEDURE — 2580000003 HC RX 258: Performed by: INTERNAL MEDICINE

## 2025-04-27 PROCEDURE — 1100000003 HC PRIVATE W/ TELEMETRY

## 2025-04-27 PROCEDURE — 36415 COLL VENOUS BLD VENIPUNCTURE: CPT

## 2025-04-27 PROCEDURE — 80048 BASIC METABOLIC PNL TOTAL CA: CPT

## 2025-04-27 PROCEDURE — 94664 DEMO&/EVAL PT USE INHALER: CPT

## 2025-04-27 PROCEDURE — 94761 N-INVAS EAR/PLS OXIMETRY MLT: CPT

## 2025-04-27 PROCEDURE — 2500000003 HC RX 250 WO HCPCS: Performed by: INTERNAL MEDICINE

## 2025-04-27 PROCEDURE — 85027 COMPLETE CBC AUTOMATED: CPT

## 2025-04-27 RX ORDER — IPRATROPIUM BROMIDE AND ALBUTEROL SULFATE 2.5; .5 MG/3ML; MG/3ML
1 SOLUTION RESPIRATORY (INHALATION)
Status: DISCONTINUED | OUTPATIENT
Start: 2025-04-27 | End: 2025-04-28 | Stop reason: HOSPADM

## 2025-04-27 RX ADMIN — METHYLPREDNISOLONE SODIUM SUCCINATE 40 MG: 40 INJECTION INTRAMUSCULAR; INTRAVENOUS at 17:22

## 2025-04-27 RX ADMIN — METHYLPREDNISOLONE SODIUM SUCCINATE 40 MG: 40 INJECTION INTRAMUSCULAR; INTRAVENOUS at 23:27

## 2025-04-27 RX ADMIN — OXYCODONE AND ACETAMINOPHEN 1 TABLET: 7.5; 325 TABLET ORAL at 18:05

## 2025-04-27 RX ADMIN — SODIUM CHLORIDE, PRESERVATIVE FREE 10 ML: 5 INJECTION INTRAVENOUS at 20:25

## 2025-04-27 RX ADMIN — IPRATROPIUM BROMIDE AND ALBUTEROL SULFATE 1 DOSE: .5; 3 SOLUTION RESPIRATORY (INHALATION) at 16:06

## 2025-04-27 RX ADMIN — AZITHROMYCIN MONOHYDRATE 500 MG: 500 INJECTION, POWDER, LYOPHILIZED, FOR SOLUTION INTRAVENOUS at 17:28

## 2025-04-27 RX ADMIN — IPRATROPIUM BROMIDE AND ALBUTEROL SULFATE 1 DOSE: .5; 3 SOLUTION RESPIRATORY (INHALATION) at 09:35

## 2025-04-27 RX ADMIN — IPRATROPIUM BROMIDE AND ALBUTEROL SULFATE 1 DOSE: .5; 3 SOLUTION RESPIRATORY (INHALATION) at 19:31

## 2025-04-27 RX ADMIN — GUAIFENESIN 200 MG: 200 SOLUTION ORAL at 14:56

## 2025-04-27 RX ADMIN — OXYCODONE AND ACETAMINOPHEN 1 TABLET: 7.5; 325 TABLET ORAL at 05:26

## 2025-04-27 RX ADMIN — ENOXAPARIN SODIUM 40 MG: 100 INJECTION SUBCUTANEOUS at 09:13

## 2025-04-27 RX ADMIN — METHYLPREDNISOLONE SODIUM SUCCINATE 40 MG: 40 INJECTION INTRAMUSCULAR; INTRAVENOUS at 09:14

## 2025-04-27 RX ADMIN — SODIUM CHLORIDE, PRESERVATIVE FREE 10 ML: 5 INJECTION INTRAVENOUS at 09:14

## 2025-04-27 ASSESSMENT — PAIN DESCRIPTION - DESCRIPTORS
DESCRIPTORS: ACHING
DESCRIPTORS: SHARP;STABBING;DISCOMFORT

## 2025-04-27 ASSESSMENT — PAIN DESCRIPTION - FREQUENCY
FREQUENCY: INTERMITTENT
FREQUENCY: INTERMITTENT

## 2025-04-27 ASSESSMENT — PAIN DESCRIPTION - LOCATION
LOCATION: HIP
LOCATION: HIP

## 2025-04-27 ASSESSMENT — PAIN SCALES - GENERAL
PAINLEVEL_OUTOF10: 0
PAINLEVEL_OUTOF10: 0
PAINLEVEL_OUTOF10: 8
PAINLEVEL_OUTOF10: 0
PAINLEVEL_OUTOF10: 9
PAINLEVEL_OUTOF10: 4

## 2025-04-27 ASSESSMENT — PAIN - FUNCTIONAL ASSESSMENT
PAIN_FUNCTIONAL_ASSESSMENT: ACTIVITIES ARE NOT PREVENTED
PAIN_FUNCTIONAL_ASSESSMENT: ACTIVITIES ARE NOT PREVENTED

## 2025-04-27 ASSESSMENT — PAIN DESCRIPTION - ORIENTATION
ORIENTATION: RIGHT
ORIENTATION: RIGHT

## 2025-04-27 ASSESSMENT — PAIN DESCRIPTION - ONSET
ONSET: GRADUAL
ONSET: GRADUAL

## 2025-04-27 ASSESSMENT — PAIN DESCRIPTION - PAIN TYPE
TYPE: CHRONIC PAIN
TYPE: CHRONIC PAIN

## 2025-04-27 NOTE — PROGRESS NOTES
4 Eyes Skin Assessment     NAME:  Oumou Martinez  YOB: 1965  MEDICAL RECORD NUMBER:  [id number]    The patient is being assessed for  Shift Handoff    I agree that at least one RN has performed a thorough Head to Toe Skin Assessment on the patient. ALL assessment sites listed below have been assessed.      Areas assessed by both nurses:    Head, Face, Ears, Shoulders, Back, Chest, Arms, Elbows, Hands, Sacrum. Buttock, Coccyx, Ischium, and Legs. Feet and Heels        Does the Patient have a Wound? No noted wound(s)       Naldo Prevention initiated by RN: No  Wound Care Orders initiated by RN: No    Pressure Injury (Stage 3,4, Unstageable, DTI, NWPT, and Complex wounds) if present, place Wound referral order by RN under : No    New Ostomies, if present place, Ostomy referral order under : No     Nurse 1 eSignature: Electronically signed by Meme Levin RN on 4/27/25 at 6:59 PM EDT    **SHARE this note so that the co-signing nurse can place an eSignature**    Nurse 2 eSignature: {Esignature:[id number]}

## 2025-04-27 NOTE — PLAN OF CARE
Problem: Discharge Planning  Goal: Discharge to home or other facility with appropriate resources  4/27/2025 1109 by Meme Levin RN  Outcome: Progressing  Flowsheets (Taken 4/27/2025 0750)  Discharge to home or other facility with appropriate resources: Identify barriers to discharge with patient and caregiver     Problem: Pain  Goal: Verbalizes/displays adequate comfort level or baseline comfort level  4/27/2025 1109 by Meme Levin RN  Outcome: Progressing  Flowsheets (Taken 4/26/2025 1054)  Verbalizes/displays adequate comfort level or baseline comfort level:   Encourage patient to monitor pain and request assistance   Assess pain using appropriate pain scale     Problem: Respiratory - Adult  Goal: Achieves optimal ventilation and oxygenation  4/27/2025 1109 by Meme Levin RN  Outcome: Progressing  Flowsheets (Taken 4/27/2025 0750)  Achieves optimal ventilation and oxygenation:   Assess for changes in respiratory status   Assess for changes in mentation and behavior   Position to facilitate oxygenation and minimize respiratory effort   Respiratory therapy support as indicated     Problem: Cardiovascular - Adult  Goal: Maintains optimal cardiac output and hemodynamic stability  4/27/2025 1109 by Meme Levin RN  Outcome: Progressing  Flowsheets (Taken 4/27/2025 0750)  Maintains optimal cardiac output and hemodynamic stability:   Monitor blood pressure and heart rate   Monitor urine output and notify Licensed Independent Practitioner for values outside of normal range   Assess for signs of decreased cardiac output     Problem: Cardiovascular - Adult  Goal: Absence of cardiac dysrhythmias or at baseline  4/27/2025 1109 by Meme Levin RN  Outcome: Progressing  Flowsheets (Taken 4/27/2025 0750)  Absence of cardiac dysrhythmias or at baseline:   Monitor cardiac rate and rhythm   Assess for signs of decreased cardiac output     Problem: Skin/Tissue Integrity  Goal: Skin integrity remains

## 2025-04-27 NOTE — PLAN OF CARE
Problem: Discharge Planning  Goal: Discharge to home or other facility with appropriate resources  4/26/2025 2211 by Gurpreet Peters RN  Outcome: Progressing  Flowsheets (Taken 4/26/2025 1023 by Meme Levin RN)  Discharge to home or other facility with appropriate resources:   Identify barriers to discharge with patient and caregiver   Arrange for needed discharge resources and transportation as appropriate  4/26/2025 1054 by Meme Levin RN  Outcome: Progressing  Flowsheets  Taken 4/26/2025 1023 by Meme Levin RN  Discharge to home or other facility with appropriate resources:   Identify barriers to discharge with patient and caregiver   Arrange for needed discharge resources and transportation as appropriate  Taken 4/25/2025 2232 by Ashley Tucker RN  Discharge to home or other facility with appropriate resources:   Identify barriers to discharge with patient and caregiver   Arrange for needed discharge resources and transportation as appropriate   Identify discharge learning needs (meds, wound care, etc)     Problem: Pain  Goal: Verbalizes/displays adequate comfort level or baseline comfort level  4/26/2025 2211 by Gurpreet Peters RN  Outcome: Progressing  Flowsheets (Taken 4/26/2025 1054 by Meme Levin RN)  Verbalizes/displays adequate comfort level or baseline comfort level:   Encourage patient to monitor pain and request assistance   Assess pain using appropriate pain scale  4/26/2025 1054 by Meme Levin RN  Outcome: Progressing  Flowsheets (Taken 4/26/2025 1054)  Verbalizes/displays adequate comfort level or baseline comfort level:   Encourage patient to monitor pain and request assistance   Assess pain using appropriate pain scale     Problem: Respiratory - Adult  Goal: Achieves optimal ventilation and oxygenation  4/26/2025 2211 by Gurpreet Peters RN  Outcome: Progressing  Flowsheets (Taken 4/26/2025 1054 by Meme Levin RN)  Achieves

## 2025-04-27 NOTE — PROGRESS NOTES
NEB PROTOCOL:  ASSESSMENT    Patient  Oumou Martinez     59 y.o.   female     4/27/2025  9:59 AM    Breath Sounds Pre Procedure:                                               Breath Sounds Post Procedure:                                                 Breathing pattern: Pre procedure             Post procedure       Cough: Pre procedure                  Post procedure      Heart Rate: Pre procedure             Post procedure      Resp Rate: Pre procedure              Post procedure          Nebulizer Therapy: Current medications         Problem List:   Patient Active Problem List   Diagnosis    Asthma with exacerbation    Acute severe exacerbation of moderate persistent asthma    Persistent asthma with acute exacerbation, unspecified asthma severity       Patient alert and cooperative to use MDI: YES    Home Respiratory Therapy Regimen/Frequency:  YES  Medication Albuterol  Device MDI  Frequency PRN per pt    SEVERITY INDEX:    ITEM 0 1 2 3 4 Score   Respiratory Pattern and or Rate Regular  10-19 Regular  20-24   24-30    30-34 Severe SOB or   Greater than 35 0   Breath Sounds Clear Occasional Wheeze Mild Wheezing Moderate Wheezing  wheezing/Absent breath sounds 1   Shortness of Breath None Dyspnea on Exertion Dyspnea at Rest Moderate Shortness of Breath at Rest Severe Shortness of Breath - Limited Speech 1       Total Score:  2    * Scoring Guidelines  0-4 pts:  PRN-BID   5-7 pts:  BID, TID, QID  8-9 pts:  TID, QID, Q6  10-12 pts:  Q4-Q6  * - Guidelines used with clinical judgement.  PRN Treatments can be ordered to supplement scheduled treatments.  Regardless of score, frequency should not be less than normal home regimen.    Recommended Order/Frequency:  Q6WA    Comments:          Respiratory Therapist: Lopez Curtis, RT

## 2025-04-28 VITALS
OXYGEN SATURATION: 96 % | TEMPERATURE: 98.2 F | BODY MASS INDEX: 21.85 KG/M2 | WEIGHT: 131.17 LBS | SYSTOLIC BLOOD PRESSURE: 119 MMHG | DIASTOLIC BLOOD PRESSURE: 92 MMHG | RESPIRATION RATE: 16 BRPM | HEIGHT: 65 IN | HEART RATE: 97 BPM

## 2025-04-28 LAB
ANION GAP SERPL CALC-SCNC: 5 MMOL/L (ref 3–18)
BUN SERPL-MCNC: 22 MG/DL (ref 7–18)
BUN/CREAT SERPL: 29 (ref 12–20)
CALCIUM SERPL-MCNC: 9 MG/DL (ref 8.5–10.1)
CHLORIDE SERPL-SCNC: 109 MMOL/L (ref 100–111)
CO2 SERPL-SCNC: 26 MMOL/L (ref 21–32)
CREAT SERPL-MCNC: 0.77 MG/DL (ref 0.6–1.3)
ERYTHROCYTE [DISTWIDTH] IN BLOOD BY AUTOMATED COUNT: 12.9 % (ref 11.6–14.5)
GLUCOSE SERPL-MCNC: 120 MG/DL (ref 74–99)
HCT VFR BLD AUTO: 41.1 % (ref 35–45)
HGB BLD-MCNC: 12.7 G/DL (ref 12–16)
MCH RBC QN AUTO: 29.3 PG (ref 24–34)
MCHC RBC AUTO-ENTMCNC: 30.9 G/DL (ref 31–37)
MCV RBC AUTO: 94.7 FL (ref 78–100)
NRBC # BLD: 0 K/UL (ref 0–0.01)
NRBC BLD-RTO: 0 PER 100 WBC
PLATELET # BLD AUTO: 142 K/UL (ref 135–420)
PMV BLD AUTO: 12.5 FL (ref 9.2–11.8)
POTASSIUM SERPL-SCNC: 4.3 MMOL/L (ref 3.5–5.5)
RBC # BLD AUTO: 4.34 M/UL (ref 4.2–5.3)
SODIUM SERPL-SCNC: 140 MMOL/L (ref 136–145)
WBC # BLD AUTO: 8.9 K/UL (ref 4.6–13.2)

## 2025-04-28 PROCEDURE — 6360000002 HC RX W HCPCS: Performed by: INTERNAL MEDICINE

## 2025-04-28 PROCEDURE — 94761 N-INVAS EAR/PLS OXIMETRY MLT: CPT

## 2025-04-28 PROCEDURE — 2500000003 HC RX 250 WO HCPCS: Performed by: INTERNAL MEDICINE

## 2025-04-28 PROCEDURE — 36415 COLL VENOUS BLD VENIPUNCTURE: CPT

## 2025-04-28 PROCEDURE — 85027 COMPLETE CBC AUTOMATED: CPT

## 2025-04-28 PROCEDURE — 94640 AIRWAY INHALATION TREATMENT: CPT

## 2025-04-28 PROCEDURE — 6370000000 HC RX 637 (ALT 250 FOR IP): Performed by: INTERNAL MEDICINE

## 2025-04-28 PROCEDURE — 80048 BASIC METABOLIC PNL TOTAL CA: CPT

## 2025-04-28 PROCEDURE — 99239 HOSP IP/OBS DSCHRG MGMT >30: CPT | Performed by: INTERNAL MEDICINE

## 2025-04-28 RX ORDER — IPRATROPIUM BROMIDE AND ALBUTEROL SULFATE 2.5; .5 MG/3ML; MG/3ML
3 SOLUTION RESPIRATORY (INHALATION)
Qty: 50 EACH | Refills: 0 | Status: SHIPPED | OUTPATIENT
Start: 2025-04-28

## 2025-04-28 RX ORDER — PREDNISONE 10 MG/1
TABLET ORAL
Qty: 30 TABLET | Refills: 0 | Status: SHIPPED | OUTPATIENT
Start: 2025-04-28

## 2025-04-28 RX ORDER — PREDNISONE 20 MG/1
20 TABLET ORAL 2 TIMES DAILY
Status: DISCONTINUED | OUTPATIENT
Start: 2025-04-28 | End: 2025-04-28 | Stop reason: HOSPADM

## 2025-04-28 RX ORDER — AZITHROMYCIN 500 MG/1
500 TABLET, FILM COATED ORAL DAILY
Qty: 1 PACKET | Refills: 0 | Status: SHIPPED | OUTPATIENT
Start: 2025-04-28 | End: 2025-05-01

## 2025-04-28 RX ADMIN — METHYLPREDNISOLONE SODIUM SUCCINATE 40 MG: 40 INJECTION INTRAMUSCULAR; INTRAVENOUS at 08:20

## 2025-04-28 RX ADMIN — IPRATROPIUM BROMIDE AND ALBUTEROL SULFATE 1 DOSE: .5; 3 SOLUTION RESPIRATORY (INHALATION) at 08:20

## 2025-04-28 RX ADMIN — SODIUM CHLORIDE, PRESERVATIVE FREE 10 ML: 5 INJECTION INTRAVENOUS at 08:20

## 2025-04-28 RX ADMIN — OXYCODONE AND ACETAMINOPHEN 1 TABLET: 7.5; 325 TABLET ORAL at 06:03

## 2025-04-28 RX ADMIN — ENOXAPARIN SODIUM 40 MG: 100 INJECTION SUBCUTANEOUS at 08:20

## 2025-04-28 ASSESSMENT — PAIN DESCRIPTION - DESCRIPTORS: DESCRIPTORS: ACHING

## 2025-04-28 ASSESSMENT — PAIN DESCRIPTION - ORIENTATION: ORIENTATION: LEFT

## 2025-04-28 ASSESSMENT — PAIN SCALES - GENERAL
PAINLEVEL_OUTOF10: 0
PAINLEVEL_OUTOF10: 7
PAINLEVEL_OUTOF10: 0
PAINLEVEL_OUTOF10: 0

## 2025-04-28 ASSESSMENT — PAIN - FUNCTIONAL ASSESSMENT: PAIN_FUNCTIONAL_ASSESSMENT: ACTIVITIES ARE NOT PREVENTED

## 2025-04-28 ASSESSMENT — PAIN DESCRIPTION - LOCATION: LOCATION: HIP

## 2025-04-28 ASSESSMENT — PAIN DESCRIPTION - PAIN TYPE: TYPE: CHRONIC PAIN

## 2025-04-28 ASSESSMENT — PAIN DESCRIPTION - ONSET: ONSET: GRADUAL

## 2025-04-28 ASSESSMENT — PAIN DESCRIPTION - FREQUENCY: FREQUENCY: INTERMITTENT

## 2025-04-28 NOTE — DISCHARGE SUMMARY
[facility]    Discharge Summary    Patient: Oumou Martinez MRN: [id number]  CSN: [id number]    YOB: 1965  Age: 59 y.o.  Sex: female    DOA: 4/25/2025 LOS:  LOS: 3 days   Discharge Date: 4/28/2025         Discharge Diagnoses:   1.  Bronchial asthma persistent  2 respiratory distress  3 cocaine use disorder    Discharge Condition: Good    Discharge To: Home    Consults: None    HPI: per Admitting MD - See dictated HPI on Admission     Hospital Course:   PI : 59-year-old female past history of bronchial asthma recurrent episodes also history of cocaine abuse, once again admitted with severe bouts of asthma increasing shortness of breath and new work of breathing came to emergency room initial treatment was given with bronchodilators steroids and magnesium but without any improvement patient was admitted to [facility].     Patient improved gradually since admission  Now she is being discharged home in a stable condition  Patient will continue to follow-up with her PCP  Patient is strongly advised to stop use of cocaine or any other illicit drugs-she verbalized understanding    Physical Exam:  General :NAD   HEENT :NAD   RS:Clear   CVS:Reg  Abd:NAD   Neuro :No Deficit   EXT: No edema     LABS Reviewed :      Significant Diagnostic Studies:     Recent Results (from the past 24 hours)   Basic Metabolic Panel    Collection Time: 04/28/25  4:25 AM   Result Value Ref Range    Sodium 140 136 - 145 mmol/L    Potassium 4.3 3.5 - 5.5 mmol/L    Chloride 109 100 - 111 mmol/L    CO2 26 21 - 32 mmol/L    Anion Gap 5 3.0 - 18 mmol/L    Glucose 120 (H) 74 - 99 mg/dL    BUN 22 (H) 7.0 - 18 MG/DL    Creatinine 0.77 0.6 - 1.3 MG/DL    BUN/Creatinine Ratio 29 (H) 12 - 20      Est, Glom Filt Rate 90 >60 ml/min/1.73m2    Calcium 9.0 8.5 - 10.1 MG/DL   CBC    Collection Time: 04/28/25  4:25 AM   Result Value Ref Range    WBC 8.9 4.6 - 13.2 K/uL    RBC 4.34 4.20 - 5.30 M/uL

## 2025-04-28 NOTE — PROGRESS NOTES
Received pt in bed. No s/s of any pain or discomfort. Resting in bed with eyes close. Easy to arouse. RR even an non labored. No bowel movement so far. Administer medications. Care plan reviewed an all questions answered.  Hydration provided. Pt ate 100% of their meal. Call bell within reach an bed is in the lowest position.

## 2025-04-28 NOTE — PLAN OF CARE
Problem: Pain  Goal: Verbalizes/displays adequate comfort level or baseline comfort level  4/28/2025 1042 by Layla Yancey, RN  Outcome: Progressing  Note: Will monitor and manage pain. Pt has a current pain regimen that is working.   4/27/2025 2132 by Gurpreet Peters, RN  Outcome: Progressing  Flowsheets (Taken 4/27/2025 2132)  Verbalizes/displays adequate comfort level or baseline comfort level: Encourage patient to monitor pain and request assistance     Problem: Cardiovascular - Adult  Goal: Maintains optimal cardiac output and hemodynamic stability  4/27/2025 2132 by Gurpreet Peters, RN  Outcome: Progressing  Flowsheets (Taken 4/27/2025 2132)  Maintains optimal cardiac output and hemodynamic stability: Monitor blood pressure and heart rate

## 2025-04-28 NOTE — PROGRESS NOTES
Spiritual Health History and Assessment/Progress Note  [facility]    Spiritual/Emotional Needs,  ,  ,      Name: Oumou Martinez MRN: [id number]    Age: 59 y.o.     Sex: female   Language: English   Pentecostal: None   Acute severe exacerbation of moderate persistent asthma     Date: 4/28/2025            Total Time Calculated: 6 min              Spiritual Assessment began in [facility] 2S TELEMETRY        Referral/Consult From: Rounding   Encounter Overview/Reason: Spiritual/Emotional Needs  Service Provided For: Patient    Liliam, Belief, Meaning:   Patient identifies as spiritual and has beliefs or practices that help with coping during difficult times  Family/Friends No family/friends present      Importance and Influence:  Patient has spiritual/personal beliefs that influence decisions regarding their health  Family/Friends No family/friends present    Community:  Patient feels well-supported. Support system includes: Spouse/Partner  Family/Friends No family/friends present    Assessment and Plan of Care:     Patient Interventions include: Facilitated expression of thoughts and feelings and Provided sacramental/Holiness ritual  Family/Friends Interventions include: No family/friends present    Patient Plan of Care: Spiritual Care available upon further referral  Family/Friends Plan of Care: No family/friends present    Electronically signed by Chaplain Isabel on 4/28/2025 at 11:57 AM

## 2025-04-28 NOTE — CARE COORDINATION
Case Management Assessment  Initial Evaluation    Date/Time of Evaluation: 4/28/2025 10:06 AM  Assessment Completed by: @ME      Patient Name: Oumou Martinez                   YOB: 1965  Diagnosis: Hypokalemia [E87.6]  Acute pulmonary edema (HCC) [J81.0]  Tachycardia [R00.0]  Thrombocytopenia [D69.6]  SIRS (systemic inflammatory response syndrome) (HCC) [R65.10]  Acute severe exacerbation of moderate persistent asthma [J45.41]  Community acquired pneumonia, unspecified laterality [J18.9]  Sepsis, due to unspecified organism, unspecified whether acute organ dysfunction present (HCC) [A41.9]  Persistent asthma with acute exacerbation, unspecified asthma severity [J45.901]                   Date / Time: 4/25/2025 11:31 AM      Met with patient in room. HIPAA verified. Initial case management admission assessment completed with patient's permission. Patient Demographics verified. Patient's preference for disposition at discharge is return home with family support.  Advance Directives:      Code Status: Full Code   Patient's Primary Decision Maker is: (P) Legal Next of Kin      Family can provide assistance at DC: (P) Yes  Would you like Case Management to discuss the discharge plan with any other family members/significant others, and if so, who?    Plans to Return to Present Housing: (P) Yes  Other Identified Issues/Barriers to RETURNING to current housing:   Potential Assistance needed at discharge: (P) N/A            Potential DME:    Patient expects to discharge to: (P) Apartment  Plan for transportation at discharge: (P) Self    Does insurance require precert for SNF: No      [id number] - [place], [place] - [address] [phone] - F [phone]  [address] [address]  Phone: [phone] Fax: [phone]    [id number] - [place], [place] - [address] [phone] - F [phone]  [address] [address]  Phone: [phone] Fax:

## 2025-04-28 NOTE — PLAN OF CARE
Problem: Discharge Planning  Goal: Discharge to home or other facility with appropriate resources  4/27/2025 2132 by Gurpreet Peters, RN  Outcome: Progressing  Flowsheets (Taken 4/27/2025 2132)  Discharge to home or other facility with appropriate resources: Identify barriers to discharge with patient and caregiver  4/27/2025 1109 by Meme Levin RN  Outcome: Progressing  Flowsheets (Taken 4/27/2025 0750)  Discharge to home or other facility with appropriate resources: Identify barriers to discharge with patient and caregiver     Problem: Pain  Goal: Verbalizes/displays adequate comfort level or baseline comfort level  4/27/2025 2132 by Gurpreet Peters, RN  Outcome: Progressing  Flowsheets (Taken 4/27/2025 2132)  Verbalizes/displays adequate comfort level or baseline comfort level: Encourage patient to monitor pain and request assistance  4/27/2025 1109 by Meme Levin RN  Outcome: Progressing  Flowsheets (Taken 4/26/2025 1054)  Verbalizes/displays adequate comfort level or baseline comfort level:   Encourage patient to monitor pain and request assistance   Assess pain using appropriate pain scale     Problem: Respiratory - Adult  Goal: Achieves optimal ventilation and oxygenation  4/27/2025 2132 by Gurpreet Peters, RN  Outcome: Progressing  Flowsheets (Taken 4/27/2025 2132)  Achieves optimal ventilation and oxygenation: Assess for changes in respiratory status  4/27/2025 1109 by Meme Levin RN  Outcome: Progressing  Flowsheets (Taken 4/27/2025 0750)  Achieves optimal ventilation and oxygenation:   Assess for changes in respiratory status   Assess for changes in mentation and behavior   Position to facilitate oxygenation and minimize respiratory effort   Respiratory therapy support as indicated     Problem: Cardiovascular - Adult  Goal: Maintains optimal cardiac output and hemodynamic stability  4/27/2025 2132 by Gurpreet Peters, RN  Outcome: Progressing  Flowsheets

## 2025-04-30 ENCOUNTER — APPOINTMENT (OUTPATIENT)
Facility: HOSPITAL | Age: 60
End: 2025-04-30
Payer: MEDICAID

## 2025-04-30 ENCOUNTER — HOSPITAL ENCOUNTER (EMERGENCY)
Facility: HOSPITAL | Age: 60
Discharge: HOME | End: 2025-04-30
Attending: STUDENT IN AN ORGANIZED HEALTH CARE EDUCATION/TRAINING PROGRAM
Payer: MEDICAID

## 2025-04-30 VITALS
OXYGEN SATURATION: 100 % | HEART RATE: 108 BPM | DIASTOLIC BLOOD PRESSURE: 83 MMHG | WEIGHT: 140.4 LBS | SYSTOLIC BLOOD PRESSURE: 117 MMHG | BODY MASS INDEX: 23.39 KG/M2 | RESPIRATION RATE: 16 BRPM | HEIGHT: 65 IN | TEMPERATURE: 97.2 F

## 2025-04-30 DIAGNOSIS — R60.0 LEG EDEMA: Primary | ICD-10-CM

## 2025-04-30 LAB
ANION GAP SERPL CALC-SCNC: 5 MMOL/L (ref 3–18)
BASOPHILS # BLD: 0.01 K/UL (ref 0–0.1)
BASOPHILS NFR BLD: 0.1 % (ref 0–2)
BUN SERPL-MCNC: 21 MG/DL (ref 7–18)
BUN/CREAT SERPL: 23 (ref 12–20)
CALCIUM SERPL-MCNC: 8.7 MG/DL (ref 8.5–10.1)
CHLORIDE SERPL-SCNC: 105 MMOL/L (ref 100–111)
CO2 SERPL-SCNC: 28 MMOL/L (ref 21–32)
CREAT SERPL-MCNC: 0.93 MG/DL (ref 0.6–1.3)
D DIMER PPP FEU-MCNC: 0.3 UG/ML(FEU)
DIFFERENTIAL METHOD BLD: ABNORMAL
EKG ATRIAL RATE: 111 BPM
EKG DIAGNOSIS: NORMAL
EKG P AXIS: 49 DEGREES
EKG P-R INTERVAL: 138 MS
EKG Q-T INTERVAL: 368 MS
EKG QRS DURATION: 82 MS
EKG QTC CALCULATION (BAZETT): 500 MS
EKG R AXIS: -48 DEGREES
EKG T AXIS: 90 DEGREES
EKG VENTRICULAR RATE: 111 BPM
EOSINOPHIL # BLD: 0.06 K/UL (ref 0–0.4)
EOSINOPHIL NFR BLD: 0.5 % (ref 0–5)
ERYTHROCYTE [DISTWIDTH] IN BLOOD BY AUTOMATED COUNT: 12.5 % (ref 11.6–14.5)
GLUCOSE SERPL-MCNC: 128 MG/DL (ref 74–99)
HCT VFR BLD AUTO: 42.7 % (ref 35–45)
HGB BLD-MCNC: 14 G/DL (ref 12–16)
IMM GRANULOCYTES # BLD AUTO: 0.05 K/UL (ref 0–0.04)
IMM GRANULOCYTES NFR BLD AUTO: 0.4 % (ref 0–0.5)
LYMPHOCYTES # BLD: 3.86 K/UL (ref 0.9–3.6)
LYMPHOCYTES NFR BLD: 31.5 % (ref 21–52)
MCH RBC QN AUTO: 29.9 PG (ref 24–34)
MCHC RBC AUTO-ENTMCNC: 32.8 G/DL (ref 31–37)
MCV RBC AUTO: 91 FL (ref 78–100)
MONOCYTES # BLD: 0.73 K/UL (ref 0.05–1.2)
MONOCYTES NFR BLD: 5.9 % (ref 3–10)
NEUTS SEG # BLD: 7.56 K/UL (ref 1.8–8)
NEUTS SEG NFR BLD: 61.6 % (ref 40–73)
NRBC # BLD: 0 K/UL (ref 0–0.01)
NRBC BLD-RTO: 0 PER 100 WBC
NT PRO BNP: 7328 PG/ML (ref 0–900)
PLATELET # BLD AUTO: 194 K/UL (ref 135–420)
PMV BLD AUTO: 12.2 FL (ref 9.2–11.8)
POTASSIUM SERPL-SCNC: 3.4 MMOL/L (ref 3.5–5.5)
RBC # BLD AUTO: 4.69 M/UL (ref 4.2–5.3)
SODIUM SERPL-SCNC: 138 MMOL/L (ref 136–145)
TROPONIN I SERPL HS-MCNC: 35 NG/L (ref 0–54)
WBC # BLD AUTO: 12.3 K/UL (ref 4.6–13.2)

## 2025-04-30 PROCEDURE — 6360000002 HC RX W HCPCS: Performed by: STUDENT IN AN ORGANIZED HEALTH CARE EDUCATION/TRAINING PROGRAM

## 2025-04-30 PROCEDURE — 85379 FIBRIN DEGRADATION QUANT: CPT

## 2025-04-30 PROCEDURE — 93005 ELECTROCARDIOGRAM TRACING: CPT | Performed by: STUDENT IN AN ORGANIZED HEALTH CARE EDUCATION/TRAINING PROGRAM

## 2025-04-30 PROCEDURE — 99285 EMERGENCY DEPT VISIT HI MDM: CPT

## 2025-04-30 PROCEDURE — 71045 X-RAY EXAM CHEST 1 VIEW: CPT

## 2025-04-30 PROCEDURE — 84484 ASSAY OF TROPONIN QUANT: CPT

## 2025-04-30 PROCEDURE — 83880 ASSAY OF NATRIURETIC PEPTIDE: CPT

## 2025-04-30 PROCEDURE — 6370000000 HC RX 637 (ALT 250 FOR IP): Performed by: STUDENT IN AN ORGANIZED HEALTH CARE EDUCATION/TRAINING PROGRAM

## 2025-04-30 PROCEDURE — 80048 BASIC METABOLIC PNL TOTAL CA: CPT

## 2025-04-30 PROCEDURE — 93010 ELECTROCARDIOGRAM REPORT: CPT | Performed by: INTERNAL MEDICINE

## 2025-04-30 PROCEDURE — 96374 THER/PROPH/DIAG INJ IV PUSH: CPT

## 2025-04-30 PROCEDURE — 85025 COMPLETE CBC W/AUTO DIFF WBC: CPT

## 2025-04-30 RX ORDER — FUROSEMIDE 10 MG/ML
20 INJECTION INTRAMUSCULAR; INTRAVENOUS
Status: COMPLETED | OUTPATIENT
Start: 2025-04-30 | End: 2025-04-30

## 2025-04-30 RX ADMIN — FUROSEMIDE 20 MG: 10 INJECTION, SOLUTION INTRAMUSCULAR; INTRAVENOUS at 10:14

## 2025-04-30 RX ADMIN — POTASSIUM BICARBONATE 40 MEQ: 782 TABLET, EFFERVESCENT ORAL at 10:14

## 2025-04-30 ASSESSMENT — ENCOUNTER SYMPTOMS
NAUSEA: 0
VOMITING: 0
CHEST TIGHTNESS: 0
SHORTNESS OF BREATH: 0
ABDOMINAL PAIN: 0
DIARRHEA: 0

## 2025-04-30 ASSESSMENT — PAIN - FUNCTIONAL ASSESSMENT: PAIN_FUNCTIONAL_ASSESSMENT: 0-10

## 2025-04-30 ASSESSMENT — PAIN SCALES - GENERAL: PAINLEVEL_OUTOF10: 8

## 2025-04-30 ASSESSMENT — PAIN DESCRIPTION - LOCATION: LOCATION: LEG

## 2025-04-30 ASSESSMENT — PAIN DESCRIPTION - ORIENTATION: ORIENTATION: RIGHT;LEFT

## 2025-04-30 NOTE — ED PROVIDER NOTES
EMERGENCY DEPARTMENT HISTORY AND PHYSICAL EXAM      Date: 4/30/2025  Patient Name: Oumou Martinez    History of Presenting Illness     Chief Complaint   Patient presents with    Shortness of Breath    Leg Swelling       Patient is a 59-year-old female with past medical history as below presenting to the emergency department for evaluation of bilateral lower extremity edema and mild shortness of breath.  Patient states that she was just discharged yesterday after being hospitalized for pneumonia.  She did not notice the swelling then but woke up this morning and noticed it.  Denies any chest pain.  Denies any fevers or chills.                 PCP: Domingo Johnson Sr., MD    No current facility-administered medications for this encounter.     Current Outpatient Medications   Medication Sig Dispense Refill    ipratropium 0.5 mg-albuterol 2.5 mg (DUONEB) 0.5-2.5 (3) MG/3ML SOLN nebulizer solution Inhale 3 mLs into the lungs every 6 hours while awake 50 each 0    predniSONE (DELTASONE) 10 MG tablet 1 tab  po four times a day for 3 days  ,Then 1 tab po three times a day for 3 days  ,Then 1 tab po two times a day for 3 days  ,Then 1 tab po daily for 3 days  = total 30 tabs 30 tablet 0    azithromycin (ZITHROMAX) 500 MG tablet Take 1 tablet by mouth daily for 3 days 1 packet 0    albuterol sulfate HFA (PROAIR HFA) 108 (90 Base) MCG/ACT inhaler Inhale 2 puffs into the lungs every 6 hours as needed for Wheezing 18 g 0    albuterol (PROVENTIL) 2.5 MG/0.5ML NEBU nebulizer solution Take 0.5 mLs by nebulization every 6 hours as needed for Wheezing 20 mL 0       Past History     Past Medical History:  Past Medical History:   Diagnosis Date    Arthritis     Asthma     Bilateral leg pain     Carpal tunnel syndrome, right     symptoms    Cellulitis of face     Cocaine dependence with other cocaine-induced disorder (HCC)     DDD (degenerative disc disease), lumbar     mild    Eczema     Hypokalemia     Infectious disease     sphills

## 2025-04-30 NOTE — ED NOTES
Readjusted patient's blood pressure cuff for accurate reading. Patient has call bell within reach and no needs at this time.

## 2025-04-30 NOTE — ED NOTES
Rounded on patient. Provided patient with a blanket. Patient has call bell within reach and is on personal phone.    No other needs at this time.

## 2025-04-30 NOTE — ED NOTES
Patient states that she feels that her lower legs are swollen. Patient does not feel that one leg is more swollen than the other. Edema +1 bilaterally on knees, shins, ankles.    Patient states that she is only short of breath when she walks. Patient denies chest pain.

## 2025-04-30 NOTE — ED NOTES
Patient provided call bell and remote to television. Patient has phone in hand. No needs at this time.

## 2025-04-30 NOTE — DISCHARGE INSTRUCTIONS
Keep your legs elevated. Wear compression stockings. Follow up with your primary care doctor. Return to the emergency department for any new or worsening symptoms.

## 2025-04-30 NOTE — ED TRIAGE NOTES
Patient presents to ER with c/o bilateral lower extremity swelling and shortness of breath after being discharged from [facility] on yesterday.

## 2025-04-30 NOTE — ED NOTES
Discharge medications received in ER reviewed with the patient and appropriate educational materials and side effects teaching were provided.     Patient had no questions at this time. Discharge paperwork in hand.

## 2025-05-01 LAB
BACTERIA SPEC CULT: NORMAL
BACTERIA SPEC CULT: NORMAL
SERVICE CMNT-IMP: NORMAL
SERVICE CMNT-IMP: NORMAL

## 2025-06-02 ENCOUNTER — APPOINTMENT (OUTPATIENT)
Age: 60
End: 2025-06-02
Payer: MEDICAID

## 2025-06-02 ENCOUNTER — HOSPITAL ENCOUNTER (EMERGENCY)
Age: 60
Discharge: HOME | End: 2025-06-02
Attending: EMERGENCY MEDICINE
Payer: MEDICAID

## 2025-06-02 VITALS
SYSTOLIC BLOOD PRESSURE: 107 MMHG | TEMPERATURE: 97.5 F | RESPIRATION RATE: 16 BRPM | HEART RATE: 104 BPM | OXYGEN SATURATION: 98 % | HEIGHT: 65 IN | BODY MASS INDEX: 21.66 KG/M2 | WEIGHT: 130 LBS | DIASTOLIC BLOOD PRESSURE: 78 MMHG

## 2025-06-02 DIAGNOSIS — S90.32XA CONTUSION OF LEFT FOOT, INITIAL ENCOUNTER: Primary | ICD-10-CM

## 2025-06-02 PROCEDURE — 73630 X-RAY EXAM OF FOOT: CPT

## 2025-06-02 PROCEDURE — 6370000000 HC RX 637 (ALT 250 FOR IP): Performed by: EMERGENCY MEDICINE

## 2025-06-02 PROCEDURE — 99283 EMERGENCY DEPT VISIT LOW MDM: CPT

## 2025-06-02 RX ORDER — ACETAMINOPHEN 500 MG
500 TABLET ORAL EVERY 6 HOURS PRN
Qty: 30 TABLET | Refills: 1 | Status: SHIPPED | OUTPATIENT
Start: 2025-06-02

## 2025-06-02 RX ORDER — TRAMADOL HYDROCHLORIDE 50 MG/1
50 TABLET ORAL EVERY 6 HOURS PRN
Qty: 3 TABLET | Refills: 0 | Status: SHIPPED | OUTPATIENT
Start: 2025-06-02 | End: 2025-06-05

## 2025-06-02 RX ORDER — ACETAMINOPHEN 325 MG/1
650 TABLET ORAL
Status: COMPLETED | OUTPATIENT
Start: 2025-06-02 | End: 2025-06-02

## 2025-06-02 RX ADMIN — ACETAMINOPHEN 650 MG: 325 TABLET ORAL at 08:09

## 2025-06-02 ASSESSMENT — ENCOUNTER SYMPTOMS
EYES NEGATIVE: 1
CHEST TIGHTNESS: 0
ABDOMINAL PAIN: 0

## 2025-06-02 ASSESSMENT — PAIN - FUNCTIONAL ASSESSMENT: PAIN_FUNCTIONAL_ASSESSMENT: 0-10

## 2025-06-02 ASSESSMENT — PAIN SCALES - GENERAL: PAINLEVEL_OUTOF10: 10

## 2025-06-02 NOTE — DISCHARGE INSTRUCTIONS
Your x-ray did not show any obvious broken bones however I am going to have the radiologist review the x-ray.  I put you in a boot that should support your foot and would like you to keep that until you see the podiatrist.  Given your history of ibuprofen I have given you Tylenol for pain but a limited number of tramadol to take as well.  I also given you information regarding substance use disorder programs and would like you to call [facility] as they are a program that have multiple services for substance use disorders.    Substance use disorder programs in [facility] Fax: [phone], phone: [phone]    [place] Fax: [phone], phone: [phone]    [place]:   [place] - [phone]  [place]- [phone]  [place]- [phone]  [place]- [phone]  [place]- [phone]  [place]-[phone]  [facility]- [phone]  [place]- [phone]    [place]:  [place]-  Fax: [phone], phone [phone] or [phone]   [place]- [phone]    [facility]:  [phone]  POC: Desiree Melvin [phone]    [facility]:  [phone]  POC: David Gastelum [phone]    Peer support warm line ([place]):  [phone]  POC: Jass Sheppard  [phone]    [facility]:  [phone]  POC: José Ludwig [phone]

## 2025-06-02 NOTE — ED TRIAGE NOTES
Patient arrives ambulatory to ED with report of left foot pain on top of foot, thinks for frozen  meat from freezer fell on top of it.

## 2025-06-02 NOTE — ED PROVIDER NOTES
EMERGENCY DEPARTMENT HISTORY AND PHYSICAL EXAM    8:06 AM      Date: 6/2/2025  Patient Name: Oumou Martinez    History of Presenting Illness     Chief Complaint   Patient presents with    Foot Pain       History From: Patient    Oumou Martinez is a 59 y.o. female   Patient is a 59-year-old female with history of asthma, eczema, chronic cocaine abuse, cigarette smoking, presents Emergency Department left foot pain for the last 4 days.  Patient was taking a roast out of the freezer and it fell onto her left foot.  Patient says she does get a be fine pain has been persistently worsening especially when she puts weight on it.  Patient said occasionally feels swollen and cold and that seems to be better this morning.  Patient cannot take ibuprofen because of allergy, and Tylenol not helping much with the pain.  The patient denies any other aggravating alleviating factors.  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  Patient denies any regular alcohol use or any other injuries.         Nursing Notes were all reviewed and agreed with or any disagreements were addressed in the HPI.    PCP: Domingo Johnson Sr., MD    Current Facility-Administered Medications   Medication Dose Route Frequency Provider Last Rate Last Admin    acetaminophen (TYLENOL) tablet 650 mg  650 mg Oral NOW Merritt Jo MD         Current Outpatient Medications   Medication Sig Dispense Refill    ipratropium 0.5 mg-albuterol 2.5 mg (DUONEB) 0.5-2.5 (3) MG/3ML SOLN nebulizer solution Inhale 3 mLs into the lungs every 6 hours while awake 50 each 0    predniSONE (DELTASONE) 10 MG tablet 1 tab  po four times a day for 3 days  ,Then 1 tab po three times a day for 3 days  ,Then 1 tab po two times a day for 3 days  ,Then 1 tab po daily for 3 days  = total 30 tabs 30 tablet 0    albuterol sulfate HFA (PROAIR HFA) 108 (90 Base) MCG/ACT inhaler Inhale 2 puffs into

## 2025-08-19 ENCOUNTER — HOSPITAL ENCOUNTER (EMERGENCY)
Age: 60
Discharge: HOME | End: 2025-08-19
Attending: STUDENT IN AN ORGANIZED HEALTH CARE EDUCATION/TRAINING PROGRAM
Payer: MEDICAID

## 2025-08-19 ENCOUNTER — APPOINTMENT (OUTPATIENT)
Age: 60
End: 2025-08-19
Payer: MEDICAID

## 2025-08-19 VITALS
OXYGEN SATURATION: 98 % | SYSTOLIC BLOOD PRESSURE: 115 MMHG | HEIGHT: 65 IN | HEART RATE: 93 BPM | RESPIRATION RATE: 20 BRPM | TEMPERATURE: 97.3 F | WEIGHT: 126 LBS | BODY MASS INDEX: 20.99 KG/M2 | DIASTOLIC BLOOD PRESSURE: 81 MMHG

## 2025-08-19 DIAGNOSIS — M25.551 RIGHT HIP PAIN: Primary | ICD-10-CM

## 2025-08-19 DIAGNOSIS — M87.051 AVASCULAR NECROSIS OF BONE OF RIGHT HIP (HCC): ICD-10-CM

## 2025-08-19 PROCEDURE — 73502 X-RAY EXAM HIP UNI 2-3 VIEWS: CPT

## 2025-08-19 PROCEDURE — 99283 EMERGENCY DEPT VISIT LOW MDM: CPT

## 2025-08-19 RX ORDER — LOSARTAN POTASSIUM 50 MG/1
50 TABLET ORAL DAILY
COMMUNITY

## 2025-08-19 RX ORDER — POLYETHYLENE GLYCOL 3350 17 G/17G
17 POWDER, FOR SOLUTION ORAL DAILY
Qty: 510 G | Refills: 0 | Status: SHIPPED | OUTPATIENT
Start: 2025-08-19 | End: 2025-09-18

## 2025-08-19 RX ORDER — SPIRONOLACTONE 25 MG/1
25 TABLET ORAL DAILY
COMMUNITY

## 2025-08-19 RX ORDER — TOPIRAMATE SPINKLE 25 MG/1
25 CAPSULE ORAL 2 TIMES DAILY
COMMUNITY

## 2025-08-19 RX ORDER — HYDROCODONE BITARTRATE AND ACETAMINOPHEN 5; 325 MG/1; MG/1
1 TABLET ORAL EVERY 4 HOURS PRN
Qty: 18 TABLET | Refills: 0 | Status: SHIPPED | OUTPATIENT
Start: 2025-08-19 | End: 2025-08-22

## 2025-08-19 RX ORDER — CARVEDILOL 3.12 MG/1
3.12 TABLET ORAL 2 TIMES DAILY WITH MEALS
COMMUNITY

## 2025-08-19 RX ORDER — FUROSEMIDE 20 MG/1
20 TABLET ORAL 2 TIMES DAILY
COMMUNITY

## 2025-08-19 ASSESSMENT — ENCOUNTER SYMPTOMS
DIARRHEA: 0
NAUSEA: 0
VOMITING: 0
SHORTNESS OF BREATH: 0
CHEST TIGHTNESS: 0
ABDOMINAL PAIN: 0

## 2025-08-19 ASSESSMENT — PAIN SCALES - GENERAL: PAINLEVEL_OUTOF10: 9

## 2025-08-19 ASSESSMENT — PAIN - FUNCTIONAL ASSESSMENT: PAIN_FUNCTIONAL_ASSESSMENT: 0-10

## 2025-08-25 ENCOUNTER — TELEPHONE (OUTPATIENT)
Age: 60
End: 2025-08-25

## 2025-08-25 ENCOUNTER — TRANSCRIBE ORDERS (OUTPATIENT)
Facility: HOSPITAL | Age: 60
End: 2025-08-25

## 2025-08-25 DIAGNOSIS — Z13.820 ENCOUNTER FOR SCREENING FOR OSTEOPOROSIS: Primary | ICD-10-CM

## 2025-08-25 DIAGNOSIS — Z12.31 ENCOUNTER FOR SCREENING MAMMOGRAM FOR MALIGNANT NEOPLASM OF BREAST: Primary | ICD-10-CM

## (undated) DEVICE — 2108 SERIES SAGITTAL BLADE (24.8 X 1.24 X 80.1MM)

## (undated) DEVICE — LIMB HOLDER, WRIST/ANKLE: Brand: DEROYAL

## (undated) DEVICE — HANDPIECE SET WITH HIGH FLOW TIP AND SUCTION TUBE: Brand: INTERPULSE

## (undated) DEVICE — PACK PROCEDURE SURG TOT HIP BSHR LF

## (undated) DEVICE — SUTURE VCRL SZ 2 L27IN ABSRB VLT L65MM TP-1 1/2 CIR J649G

## (undated) DEVICE — INTENDED FOR TISSUE SEPARATION, AND OTHER PROCEDURES THAT REQUIRE A SHARP SURGICAL BLADE TO PUNCTURE OR CUT.: Brand: BARD-PARKER SAFETY BLADES SIZE 15, STERILE

## (undated) DEVICE — PREMIUM DRY TRAY LF: Brand: MEDLINE INDUSTRIES, INC.

## (undated) DEVICE — 3M™ TEGADERM™ TRANSPARENT FILM DRESSING FRAME STYLE, 1626W, 4 IN X 4-3/4 IN (10 CM X 12 CM), 50/CT 4CT/CASE: Brand: 3M™ TEGADERM™

## (undated) DEVICE — KIT CLN UP BON SECOURS MARYV

## (undated) DEVICE — KIT EVAC 400CC DIA1/4IN H PAT 12.5IN 3 SPR RND SHP PVC DRN

## (undated) DEVICE — SYRINGE IRRIG 60ML SFT PLIABLE BLB EZ TO GRP 1 HND USE W/

## (undated) DEVICE — SUTURE VCRL SZ 2-0 L27IN ABSRB VLT L36MM CT-1 1/2 CIR J339H

## (undated) DEVICE — SOLUTION IRRIG 3000ML 0.9% SOD CHL FLX CONT 0797208] ICU MEDICAL INC]

## (undated) DEVICE — 4-PORT MANIFOLD: Brand: NEPTUNE 2

## (undated) DEVICE — 3M™ IOBAN™ 2 ANTIMICROBIAL INCISE DRAPE 6651EZ: Brand: IOBAN™ 2

## (undated) DEVICE — SYRINGE MED 30ML STD CLR PLAS LUERLOCK TIP N CTRL DISP

## (undated) DEVICE — HOOD WITH PEEL AWAY FACE SHIELD: Brand: T7PLUS

## (undated) DEVICE — GOWN,REINFORCED,POLY,AURORA,XLARGE,STRL: Brand: MEDLINE

## (undated) DEVICE — Device: Brand: JELCO

## (undated) DEVICE — INTENDED FOR TISSUE SEPARATION, AND OTHER PROCEDURES THAT REQUIRE A SHARP SURGICAL BLADE TO PUNCTURE OR CUT.: Brand: BARD-PARKER SAFETY BLADES SIZE 10, STERILE

## (undated) DEVICE — TAPE,CLOTH/SILK,CURAD,3"X10YD,LF,40/CS: Brand: CURAD

## (undated) DEVICE — TRAY,URINE METER,100% SILICONE,16FR10ML: Brand: MEDLINE

## (undated) DEVICE — DRESSING FOAM DISK DIA1IN H 7MM HYDRPHLC CHG IMPREG IN SL

## (undated) DEVICE — Device

## (undated) DEVICE — ELECTRODE PT RET AD L9FT HI MOIST COND ADH HYDRGEL CORDED

## (undated) DEVICE — APPLICATOR MEDICATED 26 CC SOLUTION HI LT ORNG CHLORAPREP

## (undated) DEVICE — SOLUTION IV 1000ML 0.9% SOD CHL